# Patient Record
Sex: MALE | Race: WHITE | Employment: FULL TIME | URBAN - METROPOLITAN AREA
[De-identification: names, ages, dates, MRNs, and addresses within clinical notes are randomized per-mention and may not be internally consistent; named-entity substitution may affect disease eponyms.]

---

## 2017-06-27 ENCOUNTER — TELEPHONE (OUTPATIENT)
Dept: UROLOGY | Age: 47
End: 2017-06-27

## 2017-06-28 ENCOUNTER — TELEPHONE (OUTPATIENT)
Dept: UROLOGY | Age: 47
End: 2017-06-28

## 2017-06-28 ENCOUNTER — NURSE ONLY (OUTPATIENT)
Dept: UROLOGY | Age: 47
End: 2017-06-28

## 2017-06-28 DIAGNOSIS — R30.0 DYSURIA: ICD-10-CM

## 2017-06-28 DIAGNOSIS — R31.9 HEMATURIA: Primary | ICD-10-CM

## 2017-06-28 DIAGNOSIS — R30.0 DYSURIA: Primary | ICD-10-CM

## 2017-06-28 LAB
BILIRUBIN, POC: ABNORMAL
BLOOD URINE, POC: ABNORMAL
CLARITY, POC: CLEAR
COLOR, POC: YELLOW
GLUCOSE URINE, POC: 100
KETONES, POC: ABNORMAL
LEUKOCYTE EST, POC: ABNORMAL
NITRITE, POC: ABNORMAL
PH, POC: 5.5
PROTEIN, POC: ABNORMAL
SPECIFIC GRAVITY, POC: 1.01
UROBILINOGEN, POC: 0.2

## 2017-06-28 PROCEDURE — 81003 URINALYSIS AUTO W/O SCOPE: CPT | Performed by: UROLOGY

## 2017-06-30 LAB — URINE CULTURE, ROUTINE: NORMAL

## 2017-11-06 ENCOUNTER — OFFICE VISIT (OUTPATIENT)
Dept: UROLOGY | Age: 47
End: 2017-11-06

## 2017-11-06 VITALS
SYSTOLIC BLOOD PRESSURE: 116 MMHG | HEART RATE: 75 BPM | HEIGHT: 70 IN | BODY MASS INDEX: 30.64 KG/M2 | DIASTOLIC BLOOD PRESSURE: 62 MMHG | WEIGHT: 214 LBS

## 2017-11-06 DIAGNOSIS — R31.9 HEMATURIA, UNSPECIFIED TYPE: Primary | ICD-10-CM

## 2017-11-06 LAB
BILIRUBIN, POC: ABNORMAL
BLOOD URINE, POC: ABNORMAL
CLARITY, POC: CLEAR
COLOR, POC: YELLOW
GLUCOSE URINE, POC: ABNORMAL
KETONES, POC: ABNORMAL
LEUKOCYTE EST, POC: ABNORMAL
NITRITE, POC: ABNORMAL
PH, POC: 5.5
PROTEIN, POC: ABNORMAL
SPECIFIC GRAVITY, POC: <=1.005
UROBILINOGEN, POC: 0.2

## 2017-11-06 PROCEDURE — 99214 OFFICE O/P EST MOD 30 MIN: CPT | Performed by: UROLOGY

## 2017-11-06 PROCEDURE — 81003 URINALYSIS AUTO W/O SCOPE: CPT | Performed by: UROLOGY

## 2017-11-06 RX ORDER — CYCLOBENZAPRINE HCL 10 MG
10 TABLET ORAL 3 TIMES DAILY PRN
COMMUNITY

## 2017-11-06 RX ORDER — METHYLPREDNISOLONE 16 MG/1
TABLET ORAL
Qty: 2 TABLET | Refills: 0 | Status: SHIPPED | OUTPATIENT
Start: 2017-11-06 | End: 2018-03-05

## 2017-11-06 NOTE — PROGRESS NOTES
Chaperone for Intimate Exam    1. Was chaperone offered as part of the rooming process? offered, declined   2. If Chaperone is declined by patient, NA: chaperone was available and exam completed  3.  Chaperone is N/A
cyclobenzaprine (FLEXERIL) 10 MG tablet Take 10 mg by mouth 3 times daily as needed for Muscle spasms      methylPREDNISolone (MEDROL) 16 MG tablet Take 1 by mouth 12 hours and 2 hours before xray dye 2 tablet 0    escitalopram (LEXAPRO) 10 MG tablet TAKE 1 TABLET BY MOUTH EVERY DAY  1    atorvastatin (LIPITOR) 20 MG tablet TAKE 1 TABLET DAILY  0    VASCEPA 1 G CAPS capsule TAKE 2 CAPSULES TWICE DAILY  0    risperiDONE (RISPERDAL) 1 MG tablet TAKE 1 TABLET TWICE DAILY. 1    traZODone (DESYREL) 50 MG tablet        No current facility-administered medications for this visit. Contrast  [iodides]  All reviewed and verified by Dr Marx Found on today's visit    No results found for: PSA, PSADIA  Results for POC orders placed in visit on 11/06/17   POCT Urinalysis No Micro (Auto)   Result Value Ref Range    Color, UA yellow     Clarity, UA clear     Glucose, UA  mg/dL     Bilirubin, UA neg     Ketones, UA neg     Spec Grav, UA <=1.005     Blood, UA POC small     pH, UA 5.5     Protein, UA POC neg     Urobilinogen, UA 0.2     Leukocytes, UA neg     Nitrite, UA neg        Physical Exam  Vitals:    11/06/17 1019   BP: 116/62   Pulse: 75   Weight: 214 lb (97.1 kg)   Height: 5' 10\" (1.778 m)     Constitutional: patient is oriented to person, place, and time. patient appears well-developed. not in distress. Ears: Adequate hearing/no hearing loss  Head: Normocephalic. Atraumatic  Neck: Normal range of motion. Cardiovascular: Normal rate, BP reviewed. sinus rhythm  Pulmonary/Chest: Normal respiratory effort  no shortness of breath  Abdominal: Not distended. No hernias  Urologic Exam  Urinalysis shows hematuria. Prostate exam deferred. .  Musculoskeletal: Normal range of motion. Patient is ambulatory. Extremities: No edema   Neurological: Cranial nerves intact   Skin: Skin is warm and dry. No lesions. No rashes   Psychiatric:  Alert and oriented ×3.   Assessment  Microhematuria and a smoker  Plan  CT

## 2018-03-05 ENCOUNTER — PROCEDURE VISIT (OUTPATIENT)
Dept: UROLOGY | Age: 48
End: 2018-03-05
Payer: COMMERCIAL

## 2018-03-05 VITALS
WEIGHT: 215 LBS | SYSTOLIC BLOOD PRESSURE: 128 MMHG | BODY MASS INDEX: 30.78 KG/M2 | DIASTOLIC BLOOD PRESSURE: 76 MMHG | HEIGHT: 70 IN | HEART RATE: 82 BPM

## 2018-03-05 DIAGNOSIS — R31.9 HEMATURIA, UNSPECIFIED TYPE: Primary | ICD-10-CM

## 2018-03-05 PROCEDURE — 99999 PR OFFICE/OUTPT VISIT,PROCEDURE ONLY: CPT | Performed by: UROLOGY

## 2018-03-05 PROCEDURE — 52000 CYSTOURETHROSCOPY: CPT | Performed by: UROLOGY

## 2018-03-05 PROCEDURE — 81003 URINALYSIS AUTO W/O SCOPE: CPT | Performed by: UROLOGY

## 2018-03-05 RX ORDER — DOXYCYCLINE HYCLATE 100 MG
100 TABLET ORAL ONCE
Qty: 1 TABLET | Refills: 0 | COMMUNITY
Start: 2018-03-05 | End: 2018-03-05

## 2018-03-05 RX ORDER — OXCARBAZEPINE 150 MG/1
TABLET, FILM COATED ORAL
COMMUNITY
Start: 2018-02-12

## 2018-03-05 NOTE — PROGRESS NOTES
Time Out was called before Cystoscopy procedure. Patient is Latasha Varghese,  is 1970. Patient has the following allergies: Allergies   Allergen Reactions    Contrast  [Iodides] Hives     Rash, Hives, Mentation change   . Patient was given Doxycycline 100 mg  antibiotic before the procedure.

## 2018-03-05 NOTE — PROGRESS NOTES
Microhematuria and a smoker  Patient appeared to have cystoscopy to complete his hematuria workup  Patient was noted to have a less than 2 mm stone left distal ureter currently asymptomatic patient probably has passed a stone    EXAMINATION  CT ABDOMEN AND PELVIS.         REASON FOR EXAM  PAIN AND HEMATURIA.         TECHNIQUE  Axial CT sections of the kidneys, ureters and bladder were   performed.         FINDINGS     There is a 2 mm stone at the left ureterovesical junction   with mild left hydronephrosis and hydroureter.  Additional punctate   bilateral nephrolithiasis are demonstrated on these noncontrast images.    The remainder of the abdomen and pelvis shows no additional acute   pathology.  Lung bases are clear.  Visualized bones intact.       IMPRESSION        A 2-MM STONE AT LEFT URETEROVESICAL JUNCTION WITH MILD LEFT   HYDRONEPHROSIS AND HYDROURETER.          -  RADWHERE        Read By- Nathen Lopez   Released By- Natehn Lopez   Released Date Time- 08/08/13 3989    This document has been electronically signed.    ------------------------------------------------------------------------------     Cystoscopy report  Flexible cystoscope/local anesthetic  Normal penile urethra  Normal prostatic urethra  Normal bladder neck on retroflexion  Clear effluxing from both ureters  Normal bladder mucosa  No evidence of tumors  Negative hematuria workup  Antibiotic given  Final diagnosis  Idiopathic hematuria versus hematuria secondary to ureteral calculus  Patient will quit smoking  If he resumes smoking he will need another workup within the next 5 years patient  Patient and his wife instructed regarding further follow-up  Dr Alvaro Jhaveri

## 2023-04-03 LAB
ALANINE AMINOTRANSFERASE (SGPT) (U/L) IN SER/PLAS: 31 U/L (ref 10–52)
ALBUMIN (G/DL) IN SER/PLAS: 4.7 G/DL (ref 3.4–5)
ALKALINE PHOSPHATASE (U/L) IN SER/PLAS: 44 U/L (ref 33–120)
ANION GAP IN SER/PLAS: 9 MMOL/L (ref 10–20)
ASPARTATE AMINOTRANSFERASE (SGOT) (U/L) IN SER/PLAS: 23 U/L (ref 9–39)
BILIRUBIN TOTAL (MG/DL) IN SER/PLAS: 0.6 MG/DL (ref 0–1.2)
CALCIDIOL (25 OH VITAMIN D3) (NG/ML) IN SER/PLAS: 36 NG/ML
CALCIUM (MG/DL) IN SER/PLAS: 9.4 MG/DL (ref 8.6–10.3)
CARBON DIOXIDE, TOTAL (MMOL/L) IN SER/PLAS: 28 MMOL/L (ref 21–32)
CHLORIDE (MMOL/L) IN SER/PLAS: 106 MMOL/L (ref 98–107)
CHOLESTEROL (MG/DL) IN SER/PLAS: 128 MG/DL (ref 0–199)
CHOLESTEROL IN HDL (MG/DL) IN SER/PLAS: 27.9 MG/DL
CHOLESTEROL/HDL RATIO: 4.6
CREATININE (MG/DL) IN SER/PLAS: 0.96 MG/DL (ref 0.5–1.3)
ESTIMATED AVERAGE GLUCOSE FOR HBA1C: 108 MG/DL
GFR MALE: >90 ML/MIN/1.73M2
GLUCOSE (MG/DL) IN SER/PLAS: 105 MG/DL (ref 74–99)
HEMOGLOBIN A1C/HEMOGLOBIN TOTAL IN BLOOD: 5.4 %
LDL: 59 MG/DL (ref 0–99)
NON HDL CHOLESTEROL: 100 MG/DL
POTASSIUM (MMOL/L) IN SER/PLAS: 4.3 MMOL/L (ref 3.5–5.3)
PROTEIN TOTAL: 7.2 G/DL (ref 6.4–8.2)
SODIUM (MMOL/L) IN SER/PLAS: 139 MMOL/L (ref 136–145)
TRIGLYCERIDE (MG/DL) IN SER/PLAS: 205 MG/DL (ref 0–149)
UREA NITROGEN (MG/DL) IN SER/PLAS: 10 MG/DL (ref 6–23)
VLDL: 41 MG/DL (ref 0–40)

## 2023-04-13 PROBLEM — I10 BENIGN ESSENTIAL HYPERTENSION: Status: ACTIVE | Noted: 2023-04-13

## 2023-04-13 PROBLEM — F98.8 ADD (ATTENTION DEFICIT DISORDER): Status: ACTIVE | Noted: 2023-04-13

## 2023-04-13 PROBLEM — I73.9 CLAUDICATION (CMS-HCC): Status: ACTIVE | Noted: 2023-04-13

## 2023-04-13 PROBLEM — E78.5 HYPERLIPIDEMIA: Status: ACTIVE | Noted: 2023-04-13

## 2023-04-13 PROBLEM — L05.91 PILONIDAL CYST: Status: ACTIVE | Noted: 2023-04-13

## 2023-04-13 PROBLEM — L91.8 SKIN TAG: Status: ACTIVE | Noted: 2023-04-13

## 2023-04-13 PROBLEM — E87.1 HYPONATREMIA: Status: ACTIVE | Noted: 2023-04-13

## 2023-04-13 PROBLEM — F33.9 RECURRENT MAJOR DEPRESSIVE DISORDER (CMS-HCC): Status: ACTIVE | Noted: 2023-04-13

## 2023-04-13 PROBLEM — M25.579 ANKLE PAIN: Status: ACTIVE | Noted: 2023-04-13

## 2023-04-13 PROBLEM — G47.33 OSA (OBSTRUCTIVE SLEEP APNEA): Status: ACTIVE | Noted: 2023-04-13

## 2023-04-13 PROBLEM — F41.9 ANXIETY: Status: ACTIVE | Noted: 2023-04-13

## 2023-04-13 PROBLEM — M25.512 BILATERAL SHOULDER PAIN: Status: ACTIVE | Noted: 2023-04-13

## 2023-04-13 PROBLEM — R06.2 WHEEZING: Status: ACTIVE | Noted: 2023-04-13

## 2023-04-13 PROBLEM — L03.032 CELLULITIS OF GREAT TOE OF LEFT FOOT: Status: ACTIVE | Noted: 2023-04-13

## 2023-04-13 PROBLEM — R31.29 OTHER MICROSCOPIC HEMATURIA: Status: ACTIVE | Noted: 2023-04-13

## 2023-04-13 PROBLEM — R82.90 ABNORMAL URINE ODOR: Status: ACTIVE | Noted: 2023-04-13

## 2023-04-13 PROBLEM — M25.511 BILATERAL SHOULDER PAIN: Status: ACTIVE | Noted: 2023-04-13

## 2023-04-13 PROBLEM — K76.0 FATTY LIVER: Status: ACTIVE | Noted: 2023-04-13

## 2023-04-13 PROBLEM — R79.89 ABNORMAL THYROID BLOOD TEST: Status: ACTIVE | Noted: 2023-04-13

## 2023-04-13 PROBLEM — R31.21 ASYMPTOMATIC MICROSCOPIC HEMATURIA: Status: ACTIVE | Noted: 2023-04-13

## 2023-04-13 PROBLEM — E53.8 DIETARY B12 DEFICIENCY: Status: ACTIVE | Noted: 2023-04-13

## 2023-04-13 PROBLEM — E11.9 DIABETES MELLITUS (MULTI): Status: ACTIVE | Noted: 2023-04-13

## 2023-04-13 PROBLEM — M25.562 LEFT KNEE PAIN: Status: ACTIVE | Noted: 2023-04-13

## 2023-04-13 PROBLEM — E55.9 VITAMIN D DEFICIENCY: Status: ACTIVE | Noted: 2023-04-13

## 2023-04-13 RX ORDER — FLUTICASONE PROPIONATE 50 MCG
2 SPRAY, SUSPENSION (ML) NASAL DAILY
COMMUNITY
Start: 2021-06-19

## 2023-04-13 RX ORDER — DULAGLUTIDE 1.5 MG/.5ML
1.5 INJECTION, SOLUTION SUBCUTANEOUS
COMMUNITY
Start: 2022-08-11 | End: 2023-04-15 | Stop reason: SDUPTHER

## 2023-04-13 RX ORDER — FLUOXETINE 20 MG/1
1 TABLET ORAL DAILY
COMMUNITY

## 2023-04-13 RX ORDER — BEMPEDOIC ACID AND EZETIMIBE 180; 10 MG/1; MG/1
1 TABLET, FILM COATED ORAL DAILY
COMMUNITY
Start: 2022-08-09

## 2023-04-13 RX ORDER — ACETAMINOPHEN 500 MG
50 TABLET ORAL DAILY
COMMUNITY
End: 2024-01-24 | Stop reason: DRUGHIGH

## 2023-04-13 RX ORDER — FLUOXETINE HYDROCHLORIDE 20 MG/1
1 CAPSULE ORAL DAILY
COMMUNITY
Start: 2019-01-19 | End: 2023-04-15 | Stop reason: WASHOUT

## 2023-04-13 RX ORDER — BLOOD SUGAR DIAGNOSTIC
STRIP MISCELLANEOUS 2 TIMES DAILY
COMMUNITY
Start: 2017-04-04 | End: 2024-01-24

## 2023-04-13 RX ORDER — SODIUM PICOSULFATE, MAGNESIUM OXIDE, AND ANHYDROUS CITRIC ACID 10; 3.5; 12 MG/160ML; G/160ML; G/160ML
LIQUID ORAL
COMMUNITY
Start: 2022-08-08 | End: 2024-02-05 | Stop reason: WASHOUT

## 2023-04-13 RX ORDER — MULTIVITAMIN
1 TABLET ORAL DAILY
COMMUNITY
End: 2024-03-13 | Stop reason: WASHOUT

## 2023-04-13 RX ORDER — AMLODIPINE BESYLATE 5 MG/1
1 TABLET ORAL DAILY
COMMUNITY
Start: 2022-04-05 | End: 2024-02-05 | Stop reason: SDUPTHER

## 2023-04-13 RX ORDER — LAMOTRIGINE 25 MG/1
2 TABLET ORAL DAILY
COMMUNITY
Start: 2022-05-09

## 2023-04-13 RX ORDER — ASPIRIN 81 MG/1
81 TABLET ORAL DAILY
COMMUNITY

## 2023-04-15 ENCOUNTER — OFFICE VISIT (OUTPATIENT)
Dept: PRIMARY CARE | Facility: CLINIC | Age: 53
End: 2023-04-15
Payer: COMMERCIAL

## 2023-04-15 VITALS
RESPIRATION RATE: 16 BRPM | BODY MASS INDEX: 28.77 KG/M2 | OXYGEN SATURATION: 95 % | TEMPERATURE: 98 F | HEIGHT: 70 IN | WEIGHT: 201 LBS | SYSTOLIC BLOOD PRESSURE: 110 MMHG | HEART RATE: 86 BPM | DIASTOLIC BLOOD PRESSURE: 64 MMHG

## 2023-04-15 DIAGNOSIS — E55.9 VITAMIN D DEFICIENCY: ICD-10-CM

## 2023-04-15 DIAGNOSIS — Z23 NEED FOR VACCINATION: ICD-10-CM

## 2023-04-15 DIAGNOSIS — E78.2 MIXED HYPERLIPIDEMIA: Primary | ICD-10-CM

## 2023-04-15 DIAGNOSIS — F33.42 RECURRENT MAJOR DEPRESSIVE DISORDER, IN FULL REMISSION (CMS-HCC): ICD-10-CM

## 2023-04-15 DIAGNOSIS — Z12.5 PROSTATE CANCER SCREENING: ICD-10-CM

## 2023-04-15 DIAGNOSIS — E11.9 TYPE 2 DIABETES MELLITUS WITHOUT COMPLICATION, WITHOUT LONG-TERM CURRENT USE OF INSULIN (MULTI): ICD-10-CM

## 2023-04-15 PROBLEM — E87.1 HYPONATREMIA: Status: RESOLVED | Noted: 2023-04-13 | Resolved: 2023-04-15

## 2023-04-15 PROBLEM — L03.032 CELLULITIS OF GREAT TOE OF LEFT FOOT: Status: RESOLVED | Noted: 2023-04-13 | Resolved: 2023-04-15

## 2023-04-15 PROBLEM — I73.9 CLAUDICATION (CMS-HCC): Status: RESOLVED | Noted: 2023-04-13 | Resolved: 2023-04-15

## 2023-04-15 PROBLEM — M25.562 LEFT KNEE PAIN: Status: RESOLVED | Noted: 2023-04-13 | Resolved: 2023-04-15

## 2023-04-15 PROBLEM — M25.579 ANKLE PAIN: Status: RESOLVED | Noted: 2023-04-13 | Resolved: 2023-04-15

## 2023-04-15 PROCEDURE — 3044F HG A1C LEVEL LT 7.0%: CPT | Performed by: FAMILY MEDICINE

## 2023-04-15 PROCEDURE — 90750 HZV VACC RECOMBINANT IM: CPT | Performed by: FAMILY MEDICINE

## 2023-04-15 PROCEDURE — 3074F SYST BP LT 130 MM HG: CPT | Performed by: FAMILY MEDICINE

## 2023-04-15 PROCEDURE — 90471 IMMUNIZATION ADMIN: CPT | Performed by: FAMILY MEDICINE

## 2023-04-15 PROCEDURE — 3078F DIAST BP <80 MM HG: CPT | Performed by: FAMILY MEDICINE

## 2023-04-15 PROCEDURE — 99214 OFFICE O/P EST MOD 30 MIN: CPT | Performed by: FAMILY MEDICINE

## 2023-04-15 RX ORDER — RISPERIDONE 1 MG/1
1 TABLET ORAL 2 TIMES DAILY
COMMUNITY

## 2023-04-15 RX ORDER — METFORMIN HYDROCHLORIDE 500 MG/1
500 TABLET, EXTENDED RELEASE ORAL
Qty: 90 TABLET | Refills: 0 | Status: SHIPPED | OUTPATIENT
Start: 2023-04-15 | End: 2023-07-15 | Stop reason: SDUPTHER

## 2023-04-15 RX ORDER — CYCLOBENZAPRINE HCL 10 MG
10 TABLET ORAL 3 TIMES DAILY PRN
COMMUNITY
Start: 2014-10-14

## 2023-04-15 RX ORDER — METFORMIN HYDROCHLORIDE 500 MG/1
500 TABLET, EXTENDED RELEASE ORAL
COMMUNITY
Start: 2023-03-31 | End: 2023-04-15 | Stop reason: SDUPTHER

## 2023-04-15 RX ORDER — DULAGLUTIDE 1.5 MG/.5ML
1.5 INJECTION, SOLUTION SUBCUTANEOUS
Qty: 12 EACH | Refills: 0 | Status: SHIPPED | OUTPATIENT
Start: 2023-04-15 | End: 2023-07-15 | Stop reason: SDUPTHER

## 2023-04-15 RX ORDER — OXCARBAZEPINE 150 MG/1
150 TABLET, FILM COATED ORAL 2 TIMES DAILY
COMMUNITY
Start: 2018-02-12 | End: 2023-04-15 | Stop reason: WASHOUT

## 2023-04-15 RX ORDER — ICOSAPENT ETHYL 1000 MG/1
2 CAPSULE ORAL 2 TIMES DAILY
COMMUNITY
End: 2023-04-15 | Stop reason: SDUPTHER

## 2023-04-15 RX ORDER — ICOSAPENT ETHYL 1000 MG/1
2 CAPSULE ORAL 2 TIMES DAILY
Qty: 360 CAPSULE | Refills: 0 | Status: SHIPPED | OUTPATIENT
Start: 2023-04-15 | End: 2023-07-15 | Stop reason: SDUPTHER

## 2023-04-15 NOTE — PATIENT INSTRUCTIONS
Follow up in 3 months with labs to be done PRIOR.    It was a pleasure to see you today. Thank you for choosing us for your health care needs.    If you have lab or other testing completed and have not been informed of results within one week, please call the office for your results.    If you haven't done so, consider signing up for [email or web address], the [facility] personal health record. Ask the staff how you can get started.

## 2023-04-15 NOTE — PROGRESS NOTES
Subjective   Patient ID: Phi Viera is a 53 y.o. male who presents for a 3-month follow-up monitoring and management of his DM Type 2, hyperlipidemia, obstructive sleep apnea, vitamin D deficiency, and anxiety/depression.      HPI     Patient has HTN.  He does not monitor his BP at home.   He reports that cardiology has him on amlodipine for elevated BP.  Denies chest pain, dizziness, LE swelling.     He has type 2 diabetes.  He does monitor his sugars at home.  Pt denies any polyuria, polydipsia, polyphagia.    He has hyperlipidemia.  Patient tries to limit the amount of fatty foods and high cholesterol foods that are consumed.  He has been compliant with his Vascepa and denies any noted side effects.  HE HAS BEEN INTOLERANT OF MULTIPLE STATINS DUE TO MYALGIA.  Pt has been tolerating the Nexlizet well.    He has known vitamin D deficiency.  Pt has been compliant with dosing of his vitamin D supplementation.    He has depression.  He is treated with Fluoxetine.   He continues to follow with his psychiatrist on a regular basis.    Patient has had persistent microscopic hematuria.  Pt was last seen by urology (Dr. Barger) for blood in his urine.   He had two cystoscopies done in the past without any abnormal findings. Pt states no further cystoscopy needed.    Patient previously had hyponatremia believed to be a result of his oxcarbazepine. Psychiatry changed medications and sodium level returned to normal on follow up labs.       He is still smoking.  Pt has a planned quit date of tomorrow 4/16/2023      Review of Systems    Constitutional: Patient denies any fever, chills, loss of appetite, or unexplained weight loss.  Cardiovascular: Patient denies any chest pain, shortness of breath with exertion, tachycardia, palpitations, orthopnea, or paroxysmal nocturnal dyspnea.  Respiratory: Patient denies any cough, shortness breath, or wheezing.  Gastrointestinal: Patient denies any nausea, vomiting, diarrhea,  "constipation, melena, hematochezia, or reflux symptoms.  Skin: Denies any rashes or skin lesions.   Neurology: Patient denies any new motor or sensory losses.  Denies any numbness, tingling, weakness, and incoordination of the extremities.  Patient also denies any tremor, seizures, or gait instability.  Endocrinology: Denies any polyuria, polydipsia, polyphagia, or heat/cold intolerance.        Objective   /64   Pulse 86   Temp 36.7 °C (98 °F)   Resp 16   Ht 1.778 m (5' 10\")   Wt 91.2 kg (201 lb)   SpO2 95%   BMI 28.84 kg/m²     Physical Exam  General Appearance: Alert and cooperative, in no acute distress, well-developed/well-nourished.  Neck: Supple and without adenopathy or rigidity.  There is no JVD at 90° and no carotid bruits are noted.  There is no thyromegaly, thyroid tenderness, or palpable thyroid nodules.  Heart: Regular rate and rhythm without murmur or ectopy.  Respiratory: Lungs are clear to auscultation bilaterally with good air exchange.  Good respiratory effort and no accessory muscle use.  Skin: Good turgor, moist, warm and without rashes or lesions.  Neurological exam: Alert and oriented ×3, no tremor, normal gait.  Extremities: No clubbing, cyanosis, or edema            Assessment/Plan          SHINGRIX DOSE #1 GIVEN TODAY      Hyperlipidemia: Stable based on his last labs.  Triglycerides were elevated (351), otherwise LDL is well controlled (34).   Dietary changes, exercise, and maintenance of healthy weight were discussed at length.  HE HAS BEEN INTOLERANT OF MULTIPLE STATINS IN THE PAST BUT IS TOLERATING THE NEXLIZET WELL.    DM Type 2:   His most recent labs reveal an A1c of 5.9%.  Dietary changes, exercise, and maintenance of a healthy weight were discussed at length.    Vit D deficiency: We will continue to monitor.   He will continue the vitamin D at a dose of 5000 units 5 days per week.    Tobacco Abuse: He has unfortunately continued to smoke.   SMOKING CESSATION ONCE AGAIN " ENCOURAGED.  Patient understands that continued smoking will increase the risks of lung disease, vascular disease, and multiple malignancies.  HE HAS A QUIT DATE OF 4/16/2023 PLANNED    Depression: Stable based on symptoms.   Pt will continue to follow up with his psychiatrist on a regular basis.  Continue current medications.        Colonoscopy completed in October 2022. Due for repeat in 2 years.

## 2023-07-15 ENCOUNTER — OFFICE VISIT (OUTPATIENT)
Dept: PRIMARY CARE | Facility: CLINIC | Age: 53
End: 2023-07-15
Payer: COMMERCIAL

## 2023-07-15 VITALS
TEMPERATURE: 98 F | RESPIRATION RATE: 16 BRPM | DIASTOLIC BLOOD PRESSURE: 68 MMHG | BODY MASS INDEX: 28.12 KG/M2 | OXYGEN SATURATION: 96 % | HEART RATE: 85 BPM | WEIGHT: 196 LBS | SYSTOLIC BLOOD PRESSURE: 118 MMHG

## 2023-07-15 DIAGNOSIS — E11.9 TYPE 2 DIABETES MELLITUS WITHOUT COMPLICATION, WITHOUT LONG-TERM CURRENT USE OF INSULIN (MULTI): ICD-10-CM

## 2023-07-15 DIAGNOSIS — F33.42 RECURRENT MAJOR DEPRESSIVE DISORDER, IN FULL REMISSION (CMS-HCC): ICD-10-CM

## 2023-07-15 DIAGNOSIS — I10 BENIGN ESSENTIAL HYPERTENSION: Primary | ICD-10-CM

## 2023-07-15 DIAGNOSIS — E55.9 VITAMIN D DEFICIENCY: ICD-10-CM

## 2023-07-15 DIAGNOSIS — E78.2 MIXED HYPERLIPIDEMIA: ICD-10-CM

## 2023-07-15 PROCEDURE — 3078F DIAST BP <80 MM HG: CPT | Performed by: FAMILY MEDICINE

## 2023-07-15 PROCEDURE — 3074F SYST BP LT 130 MM HG: CPT | Performed by: FAMILY MEDICINE

## 2023-07-15 PROCEDURE — 99214 OFFICE O/P EST MOD 30 MIN: CPT | Performed by: FAMILY MEDICINE

## 2023-07-15 PROCEDURE — 3044F HG A1C LEVEL LT 7.0%: CPT | Performed by: FAMILY MEDICINE

## 2023-07-15 RX ORDER — DULAGLUTIDE 1.5 MG/.5ML
1.5 INJECTION, SOLUTION SUBCUTANEOUS
Qty: 12 EACH | Refills: 0 | Status: SHIPPED | OUTPATIENT
Start: 2023-07-15 | End: 2024-01-24 | Stop reason: SDUPTHER

## 2023-07-15 RX ORDER — METFORMIN HYDROCHLORIDE 500 MG/1
1000 TABLET, EXTENDED RELEASE ORAL
Qty: 180 TABLET | Refills: 0 | Status: SHIPPED | OUTPATIENT
Start: 2023-07-15 | End: 2024-05-14 | Stop reason: WASHOUT

## 2023-07-15 RX ORDER — ICOSAPENT ETHYL 1000 MG/1
2 CAPSULE ORAL 2 TIMES DAILY
Qty: 360 CAPSULE | Refills: 0 | Status: SHIPPED | OUTPATIENT
Start: 2023-07-15 | End: 2023-10-13

## 2023-07-15 NOTE — PATIENT INSTRUCTIONS
Follow up in 3 months with labs to be done PRIOR.    It was a pleasure to see you today. Thank you for choosing us for your health care needs.    If you have lab or other testing completed and have not been informed of results within one week, please call the office for your results.    If you haven't done so, consider signing up for [facility], the [facility] personal health record. Ask the staff how you can get started.

## 2023-07-15 NOTE — PROGRESS NOTES
Subjective   Patient ID: Phi Viera is a 53 y.o. male who presents for Follow-up (3 month ).    HPI         LABS ORDERED FOR HIS 7/15/23 APPT WERE NOT COMPLETED.  LAST LABS DONE 4/3/2023          Patient has HTN.  He does not monitor his BP at home.   His cardiologist prescribed his Amlodipine.  Denies chest pain, dizziness, LE swelling.      He has type 2 diabetes.  He does monitor his sugars at home.  Pt denies any polyuria, polydipsia, polyphagia.     He has hyperlipidemia.  Pt tries to limit the amount of fatty & high cholesterol foods that are consumed.  He has been compliant with his Vascepa and denies any noted side effects.  HE HAS BEEN INTOLERANT OF MULTIPLE STATINS DUE TO MYALGIA.  Pt has been tolerating the Nexlizet well.     Pt has known vitamin D deficiency.  He has been compliant with dosing of his vitamin D supplementation.     Pt has depression.  He is treated with Fluoxetine.   He continues to follow with his psychiatrist on a regular basis.              7/15/23: He is still smoking.       Review of Systems  Constitutional: Patient denies any fever, chills, loss of appetite, or unexplained weight loss.  Cardiovascular: Patient denies any chest pain, shortness of breath with exertion, tachycardia, palpitations, orthopnea, or paroxysmal nocturnal dyspnea.  Respiratory: Patient denies any cough, shortness breath, or wheezing.  Gastrointestinal: Patient denies any nausea, vomiting, diarrhea, constipation, melena, hematochezia, or reflux symptoms.  Skin: Denies any rashes or skin lesions.   Neurology: Patient denies any new motor or sensory losses.  Denies any numbness, tingling, weakness, and incoordination of the extremities.  Patient also denies any tremor, seizures, or gait instability.  Endocrinology: Denies any polyuria, polydipsia, polyphagia, or heat/cold intolerance.      Objective   /68   Pulse 85   Temp 36.7 °C (98 °F)   Resp 16   Wt 88.9 kg (196 lb)   SpO2 96%   BMI 28.12 kg/m²      Physical Exam  General Appearance: Alert and cooperative, in no acute distress, well-developed/well-nourished male.  Neck: Supple and without adenopathy or rigidity.  There is no JVD at 90° and no carotid bruits are noted.  There is no thyromegaly, thyroid tenderness, or palpable thyroid nodules.  Heart: Regular rate and rhythm without murmur or ectopy.  Respiratory: Lungs are clear to auscultation bilaterally with good air exchange.  Good respiratory effort and no accessory muscle use.  Skin: Good turgor, moist, warm and without rashes or lesions.  Neurological exam: Alert and oriented ×3, no tremor, normal gait.  Extremities: No clubbing, cyanosis, or edema      Assessment/Plan     HTN:  Blood pressure appears adequately controlled and we will continue with the current antihypertensive therapy.    Hyperlipidemia: Stable based on his last labs.  Triglycerides were elevated (351), otherwise LDL is well controlled.   Dietary changes, exercise, and maintenance of healthy weight were discussed at length.  Continues to tolerate the Nexlizet well.     DM Type 2:   His most recent labs reveal an A1c of 5.9%.  Dietary changes, exercise, and maintenance of a healthy weight were discussed at length.     Vit D deficiency: We will continue to monitor.   He will continue the vitamin D at a dose of 5000 units 5 days per week.     Tobacco Abuse: He has unfortunately continued to smoke.   SMOKING CESSATION ONCE AGAIN ENCOURAGED.  Patient understands that continued smoking will increase the risks of lung disease, vascular disease, and multiple malignancies.     Depression: Stable based on symptoms.   Pt will continue to follow up with his psychiatrist on a regular basis.  Continue current medications.           Colonoscopy completed in October 2022. Due for repeat in 2 years.        Orders Placed This Encounter   Procedures    Albumin , Urine Random    Comprehensive Metabolic Panel    Hemoglobin A1C    Lipid Panel     Requested  Prescriptions     Signed Prescriptions Disp Refills    metFORMIN XR (Glucophage-XR) 500 mg 24 hr tablet 180 tablet 0     Sig: Take 2 tablets (1,000 mg) by mouth once daily in the evening. Take with meals.    Vascepa 1 gram capsule 360 capsule 0     Sig: Take 2 capsules (2 g) by mouth 2 times a day.    dulaglutide (Trulicity) 1.5 mg/0.5 mL pen injector injection 12 each 0     Sig: Inject 1.5 mg under the skin 1 (one) time per week.

## 2023-09-11 DIAGNOSIS — E11.9 TYPE 2 DIABETES MELLITUS WITHOUT COMPLICATION, WITHOUT LONG-TERM CURRENT USE OF INSULIN (MULTI): ICD-10-CM

## 2023-09-11 RX ORDER — METFORMIN HYDROCHLORIDE 500 MG/1
500 TABLET, EXTENDED RELEASE ORAL
Qty: 90 TABLET | OUTPATIENT
Start: 2023-09-11

## 2023-09-13 ENCOUNTER — TELEPHONE (OUTPATIENT)
Dept: PRIMARY CARE | Facility: CLINIC | Age: 53
End: 2023-09-13

## 2023-10-16 ENCOUNTER — LAB (OUTPATIENT)
Dept: LAB | Facility: LAB | Age: 53
End: 2023-10-16
Payer: COMMERCIAL

## 2023-10-16 DIAGNOSIS — Z12.5 PROSTATE CANCER SCREENING: ICD-10-CM

## 2023-10-16 DIAGNOSIS — E78.2 MIXED HYPERLIPIDEMIA: ICD-10-CM

## 2023-10-16 DIAGNOSIS — E11.9 TYPE 2 DIABETES MELLITUS WITHOUT COMPLICATION, WITHOUT LONG-TERM CURRENT USE OF INSULIN (MULTI): ICD-10-CM

## 2023-10-16 LAB
ALBUMIN SERPL BCP-MCNC: 4.4 G/DL (ref 3.4–5)
ALP SERPL-CCNC: 56 U/L (ref 33–120)
ALT SERPL W P-5'-P-CCNC: 15 U/L (ref 10–52)
ANION GAP SERPL CALC-SCNC: 10 MMOL/L (ref 10–20)
AST SERPL W P-5'-P-CCNC: 17 U/L (ref 9–39)
BILIRUB SERPL-MCNC: 0.3 MG/DL (ref 0–1.2)
BUN SERPL-MCNC: 15 MG/DL (ref 6–23)
CALCIUM SERPL-MCNC: 9.5 MG/DL (ref 8.6–10.3)
CHLORIDE SERPL-SCNC: 105 MMOL/L (ref 98–107)
CHOLEST SERPL-MCNC: 120 MG/DL (ref 0–199)
CHOLESTEROL/HDL RATIO: 3.7
CO2 SERPL-SCNC: 28 MMOL/L (ref 21–32)
CREAT SERPL-MCNC: 1.22 MG/DL (ref 0.5–1.3)
CREAT UR-MCNC: 101.6 MG/DL (ref 20–370)
EST. AVERAGE GLUCOSE BLD GHB EST-MCNC: 126 MG/DL
GFR SERPL CREATININE-BSD FRML MDRD: 72 ML/MIN/1.73M*2
GLUCOSE SERPL-MCNC: 97 MG/DL (ref 74–99)
HBA1C MFR BLD: 6 %
HDLC SERPL-MCNC: 32.1 MG/DL
LDLC SERPL CALC-MCNC: 41 MG/DL
MICROALBUMIN UR-MCNC: <7 MG/L
MICROALBUMIN/CREAT UR: NORMAL MG/G{CREAT}
NON HDL CHOLESTEROL: 88 MG/DL (ref 0–149)
POTASSIUM SERPL-SCNC: 4.2 MMOL/L (ref 3.5–5.3)
PROT SERPL-MCNC: 7 G/DL (ref 6.4–8.2)
PSA SERPL-MCNC: 0.07 NG/ML
SODIUM SERPL-SCNC: 139 MMOL/L (ref 136–145)
TRIGL SERPL-MCNC: 234 MG/DL (ref 0–149)
VLDL: 47 MG/DL (ref 0–40)

## 2023-10-16 PROCEDURE — 80053 COMPREHEN METABOLIC PANEL: CPT

## 2023-10-16 PROCEDURE — 82043 UR ALBUMIN QUANTITATIVE: CPT

## 2023-10-16 PROCEDURE — 80061 LIPID PANEL: CPT

## 2023-10-16 PROCEDURE — 36415 COLL VENOUS BLD VENIPUNCTURE: CPT

## 2023-10-16 PROCEDURE — 83036 HEMOGLOBIN GLYCOSYLATED A1C: CPT

## 2023-10-16 PROCEDURE — 82570 ASSAY OF URINE CREATININE: CPT

## 2023-10-16 PROCEDURE — 84153 ASSAY OF PSA TOTAL: CPT

## 2023-10-23 ENCOUNTER — OFFICE VISIT (OUTPATIENT)
Dept: PRIMARY CARE | Facility: CLINIC | Age: 53
End: 2023-10-23
Payer: COMMERCIAL

## 2023-10-23 VITALS
OXYGEN SATURATION: 96 % | DIASTOLIC BLOOD PRESSURE: 72 MMHG | TEMPERATURE: 98.4 F | SYSTOLIC BLOOD PRESSURE: 111 MMHG | HEART RATE: 78 BPM | HEIGHT: 70 IN | BODY MASS INDEX: 27.77 KG/M2 | WEIGHT: 194 LBS

## 2023-10-23 DIAGNOSIS — I10 BENIGN ESSENTIAL HYPERTENSION: Primary | ICD-10-CM

## 2023-10-23 DIAGNOSIS — E55.9 VITAMIN D DEFICIENCY: ICD-10-CM

## 2023-10-23 DIAGNOSIS — F33.42 RECURRENT MAJOR DEPRESSIVE DISORDER, IN FULL REMISSION (CMS-HCC): ICD-10-CM

## 2023-10-23 DIAGNOSIS — E11.9 TYPE 2 DIABETES MELLITUS WITHOUT COMPLICATION, WITHOUT LONG-TERM CURRENT USE OF INSULIN (MULTI): ICD-10-CM

## 2023-10-23 DIAGNOSIS — E78.2 MIXED HYPERLIPIDEMIA: ICD-10-CM

## 2023-10-23 DIAGNOSIS — F17.210 CIGARETTE SMOKER: ICD-10-CM

## 2023-10-23 PROBLEM — M25.562 LEFT KNEE PAIN: Status: ACTIVE | Noted: 2023-10-23

## 2023-10-23 PROBLEM — Z79.85 LONG-TERM (CURRENT) USE OF INJECTABLE NON-INSULIN ANTIDIABETIC DRUGS: Status: ACTIVE | Noted: 2022-10-21

## 2023-10-23 PROBLEM — F17.200 NICOTINE DEPENDENCE: Status: ACTIVE | Noted: 2022-10-21

## 2023-10-23 PROBLEM — Z72.0 TOBACCO USE: Status: ACTIVE | Noted: 2023-06-06

## 2023-10-23 PROBLEM — Z79.82 LONG TERM (CURRENT) USE OF ASPIRIN: Status: ACTIVE | Noted: 2022-10-21

## 2023-10-23 PROBLEM — R07.9 CHEST PAIN: Status: ACTIVE | Noted: 2023-10-23

## 2023-10-23 PROBLEM — E66.3 OVERWEIGHT: Status: ACTIVE | Noted: 2023-10-23

## 2023-10-23 PROBLEM — E66.9 OBESITY: Status: ACTIVE | Noted: 2023-10-23

## 2023-10-23 PROBLEM — M25.519 SHOULDER PAIN: Status: ACTIVE | Noted: 2023-04-13

## 2023-10-23 PROBLEM — Z86.59 HISTORY OF MENTAL DISORDER: Status: ACTIVE | Noted: 2023-10-23

## 2023-10-23 PROBLEM — K57.30 DIVERTICULOSIS OF LARGE INTESTINE WITHOUT PERFORATION OR ABSCESS WITHOUT BLEEDING: Status: ACTIVE | Noted: 2022-10-21

## 2023-10-23 PROBLEM — Z79.84 LONG TERM (CURRENT) USE OF ORAL HYPOGLYCEMIC DRUGS: Status: ACTIVE | Noted: 2022-10-21

## 2023-10-23 PROBLEM — F32.A DEPRESSIVE DISORDER: Status: ACTIVE | Noted: 2023-04-13

## 2023-10-23 PROCEDURE — 3062F POS MACROALBUMINURIA REV: CPT | Performed by: FAMILY MEDICINE

## 2023-10-23 PROCEDURE — 3078F DIAST BP <80 MM HG: CPT | Performed by: FAMILY MEDICINE

## 2023-10-23 PROCEDURE — 3074F SYST BP LT 130 MM HG: CPT | Performed by: FAMILY MEDICINE

## 2023-10-23 PROCEDURE — 3048F LDL-C <100 MG/DL: CPT | Performed by: FAMILY MEDICINE

## 2023-10-23 PROCEDURE — 3044F HG A1C LEVEL LT 7.0%: CPT | Performed by: FAMILY MEDICINE

## 2023-10-23 PROCEDURE — 99214 OFFICE O/P EST MOD 30 MIN: CPT | Performed by: FAMILY MEDICINE

## 2023-10-23 RX ORDER — CLOTRIMAZOLE 10 MG/1
10 LOZENGE ORAL; TOPICAL
COMMUNITY
Start: 2023-10-17 | End: 2024-01-24

## 2023-10-23 ASSESSMENT — PATIENT HEALTH QUESTIONNAIRE - PHQ9
1. LITTLE INTEREST OR PLEASURE IN DOING THINGS: NOT AT ALL
SUM OF ALL RESPONSES TO PHQ9 QUESTIONS 1 AND 2: 0
2. FEELING DOWN, DEPRESSED OR HOPELESS: NOT AT ALL

## 2023-10-23 NOTE — PATIENT INSTRUCTIONS
Follow up in 3 months with labs to be done PRIOR.    It was a pleasure to see you today. Thank you for choosing us for your health care needs.    If you have lab or other testing completed and have not been informed of results within one week, please call the office for your results.    If you haven't done so, consider signing up for [facility], the [facility] personal health record. Ask the staff how you can get started.

## 2023-10-23 NOTE — PROGRESS NOTES
Subjective   Patient ID: Phi Viera is a 53 y.o. male who presents for Follow-up.    HPI     Has had some dental problems so he has really limited his diet to soft foods.  Was getting low glucose readings as a result so he cut out his diabetes medications.  Just restarted on 1 metformin per day and the Vascepa.    Remains off the Trulicity.        NO NEW CONCERNS   Labs: 10/16/2023  Colonoscopy: 2022     Patient has HTN.  He does not monitor his BP at home.   His cardiologist prescribed his Amlodipine.  Denies chest pain, dizziness, LE swelling.      He has type 2 diabetes.  He does monitor his sugars at home.  Pt denies any polyuria, polydipsia, polyphagia.      He has hyperlipidemia.  Pt tries to limit the amount of fatty & high cholesterol foods that are consumed.  He has been compliant with his Vascepa and denies any noted side effects.  Intolerant of multiple statins in the past.  Pt has been tolerating the Nexlizet well.     Pt has known vitamin D deficiency.  He has been compliant with dosing of his vitamin D supplementation.     Pt has depression.  He is treated with Fluoxetine.   He continues to follow with his psychiatrist on a regular basis.               7/15/23: He is still smoking.      Has had flu vaccine this season       Review of Systems  Constitutional: Patient denies any fever, chills, loss of appetite, or unexplained weight loss.  Cardiovascular: Patient denies any chest pain, shortness of breath with exertion, tachycardia, palpitations, orthopnea, or paroxysmal nocturnal dyspnea.  Respiratory: Patient denies any cough, shortness breath, or wheezing.  Gastrointestinal: Patient denies any nausea, vomiting, diarrhea, constipation, melena, hematochezia, or reflux symptoms.  Skin: Denies any rashes or skin lesions.   Neurology: Patient denies any new motor or sensory losses.  Denies any numbness, tingling, weakness, and incoordination of the extremities.  Patient also denies any tremor, seizures,  "or gait instability.  Endocrinology: Denies any polyuria, polydipsia, polyphagia, or heat/cold intolerance.        Objective   /72   Pulse 78   Temp 36.9 °C (98.4 °F)   Ht 1.778 m (5' 10\")   Wt 88 kg (194 lb)   SpO2 96%   BMI 27.84 kg/m²     Physical Exam  General Appearance: Alert and cooperative, in no acute distress, well-developed/well-nourished.  Neck: Supple and without adenopathy or rigidity.  There is no JVD at 90° and no carotid bruits are noted.  There is no thyromegaly, thyroid tenderness, or palpable thyroid nodules.  Heart: Regular rate and rhythm without murmur or ectopy.  Respiratory: Lungs are clear to auscultation bilaterally with good air exchange.  Good respiratory effort and no accessory muscle use.  Skin: Good turgor, moist, warm and without rashes or lesions.  Neurological exam: Alert and oriented ×3, no tremor, normal gait.  Extremities: No clubbing, cyanosis, or edema      Assessment/Plan     HTN:  Blood pressure appears adequately controlled and we will continue with the current antihypertensive therapy.  Continue the current medication.     Hyperlipidemia: Stable based on his last labs.  Continue the current medication.   Dietary changes, exercise, and maintenance of healthy weight were discussed at length.  Continues to tolerate the Nexlizet well.     DM Type 2:   His most recent labs reveal an A1c of:  Lab Results   Component Value Date    HGBA1C 6.0 (H) 10/16/2023   Dietary changes, exercise, and maintenance of a healthy weight were discussed at length.  10/23/2023:  Continue off the Trulicity and take 1 metformin per day.  May need to restart Trulicity if glucose increases once his dental work is complete and he is eating more normal again.     Vit D deficiency: We will continue to monitor.   He will continue the vitamin D at a dose of 5000 units 5 days per week.     Cigarette smoker: He has unfortunately continued to smoke.   SMOKING CESSATION ONCE AGAIN " ENCOURAGED.  Patient understands that continued smoking will increase the risks of lung disease, vascular disease, and multiple malignancies.     Depression: Stable based on symptoms.   Pt will continue to follow up with his psychiatrist on a regular basis.  Continue current medications.           Colonoscopy completed in October 2022. Due for repeat in 2 years.      Orders Placed This Encounter   Procedures    Hemoglobin A1C    Basic Metabolic Panel    Vitamin D 25-Hydroxy,Total (for eval of Vitamin D levels)

## 2024-01-15 ENCOUNTER — LAB (OUTPATIENT)
Dept: LAB | Facility: LAB | Age: 54
End: 2024-01-15
Payer: COMMERCIAL

## 2024-01-15 DIAGNOSIS — I10 BENIGN ESSENTIAL HYPERTENSION: ICD-10-CM

## 2024-01-15 DIAGNOSIS — E55.9 VITAMIN D DEFICIENCY: ICD-10-CM

## 2024-01-15 DIAGNOSIS — E11.9 TYPE 2 DIABETES MELLITUS WITHOUT COMPLICATION, WITHOUT LONG-TERM CURRENT USE OF INSULIN (MULTI): ICD-10-CM

## 2024-01-15 LAB
25(OH)D3 SERPL-MCNC: 25 NG/ML (ref 30–100)
ANION GAP SERPL CALC-SCNC: 13 MMOL/L (ref 10–20)
BUN SERPL-MCNC: 11 MG/DL (ref 6–23)
CALCIUM SERPL-MCNC: 9.2 MG/DL (ref 8.6–10.3)
CHLORIDE SERPL-SCNC: 105 MMOL/L (ref 98–107)
CO2 SERPL-SCNC: 26 MMOL/L (ref 21–32)
CREAT SERPL-MCNC: 1.07 MG/DL (ref 0.5–1.3)
EGFRCR SERPLBLD CKD-EPI 2021: 84 ML/MIN/1.73M*2
EST. AVERAGE GLUCOSE BLD GHB EST-MCNC: 128 MG/DL
GLUCOSE SERPL-MCNC: 109 MG/DL (ref 74–99)
HBA1C MFR BLD: 6.1 %
POTASSIUM SERPL-SCNC: 4.5 MMOL/L (ref 3.5–5.3)
SODIUM SERPL-SCNC: 139 MMOL/L (ref 136–145)

## 2024-01-15 PROCEDURE — 83036 HEMOGLOBIN GLYCOSYLATED A1C: CPT

## 2024-01-15 PROCEDURE — 80048 BASIC METABOLIC PNL TOTAL CA: CPT

## 2024-01-15 PROCEDURE — 36415 COLL VENOUS BLD VENIPUNCTURE: CPT

## 2024-01-15 PROCEDURE — 82306 VITAMIN D 25 HYDROXY: CPT

## 2024-01-16 ENCOUNTER — APPOINTMENT (OUTPATIENT)
Dept: PRIMARY CARE | Facility: CLINIC | Age: 54
End: 2024-01-16
Payer: COMMERCIAL

## 2024-01-24 ENCOUNTER — OFFICE VISIT (OUTPATIENT)
Dept: PRIMARY CARE | Facility: CLINIC | Age: 54
End: 2024-01-24
Payer: COMMERCIAL

## 2024-01-24 VITALS
HEART RATE: 77 BPM | HEIGHT: 70 IN | OXYGEN SATURATION: 97 % | SYSTOLIC BLOOD PRESSURE: 116 MMHG | BODY MASS INDEX: 27.84 KG/M2 | TEMPERATURE: 98 F | DIASTOLIC BLOOD PRESSURE: 70 MMHG

## 2024-01-24 DIAGNOSIS — E78.2 MIXED HYPERLIPIDEMIA: ICD-10-CM

## 2024-01-24 DIAGNOSIS — I10 BENIGN ESSENTIAL HYPERTENSION: Primary | ICD-10-CM

## 2024-01-24 DIAGNOSIS — Z86.010 HISTORY OF COLON POLYPS: ICD-10-CM

## 2024-01-24 DIAGNOSIS — F17.210 CIGARETTE SMOKER: ICD-10-CM

## 2024-01-24 DIAGNOSIS — E11.9 TYPE 2 DIABETES MELLITUS WITHOUT COMPLICATION, WITHOUT LONG-TERM CURRENT USE OF INSULIN (MULTI): ICD-10-CM

## 2024-01-24 DIAGNOSIS — E55.9 VITAMIN D DEFICIENCY: ICD-10-CM

## 2024-01-24 PROBLEM — R07.9 CHEST PAIN: Status: RESOLVED | Noted: 2023-10-23 | Resolved: 2024-01-24

## 2024-01-24 PROBLEM — Z86.0100 HISTORY OF COLON POLYPS: Status: ACTIVE | Noted: 2024-01-24

## 2024-01-24 PROCEDURE — 3078F DIAST BP <80 MM HG: CPT | Performed by: FAMILY MEDICINE

## 2024-01-24 PROCEDURE — 3074F SYST BP LT 130 MM HG: CPT | Performed by: FAMILY MEDICINE

## 2024-01-24 PROCEDURE — 3044F HG A1C LEVEL LT 7.0%: CPT | Performed by: FAMILY MEDICINE

## 2024-01-24 PROCEDURE — 1036F TOBACCO NON-USER: CPT | Performed by: FAMILY MEDICINE

## 2024-01-24 PROCEDURE — 99214 OFFICE O/P EST MOD 30 MIN: CPT | Performed by: FAMILY MEDICINE

## 2024-01-24 RX ORDER — DULAGLUTIDE 1.5 MG/.5ML
1.5 INJECTION, SOLUTION SUBCUTANEOUS
Qty: 12 EACH | Refills: 0 | Status: SHIPPED | OUTPATIENT
Start: 2024-01-24 | End: 2024-02-12 | Stop reason: SDUPTHER

## 2024-01-24 RX ORDER — BLOOD-GLUCOSE METER
1 EACH MISCELLANEOUS DAILY
Qty: 100 STRIP | Refills: 3 | Status: SHIPPED | OUTPATIENT
Start: 2024-01-24

## 2024-01-24 RX ORDER — BLOOD-GLUCOSE METER
EACH MISCELLANEOUS
COMMUNITY
End: 2024-01-24 | Stop reason: SDUPTHER

## 2024-01-24 RX ORDER — ACETAMINOPHEN 500 MG
4000 TABLET ORAL DAILY
Status: SHIPPED
Start: 2024-01-24

## 2024-01-24 ASSESSMENT — PATIENT HEALTH QUESTIONNAIRE - PHQ9
SUM OF ALL RESPONSES TO PHQ9 QUESTIONS 1 AND 2: 0
1. LITTLE INTEREST OR PLEASURE IN DOING THINGS: NOT AT ALL
2. FEELING DOWN, DEPRESSED OR HOPELESS: NOT AT ALL

## 2024-01-24 NOTE — PATIENT INSTRUCTIONS
Follow up in 3 months with labs to be done PRIOR.    It was a pleasure to see you today. Thank you for choosing us for your health care needs.    If you have lab or other testing completed and have not been informed of results within one week, please call the office for your results.    If you haven't done so, consider signing up for [facility] [employer], the [facility] personal health record. Ask the staff how you can get started.

## 2024-01-24 NOTE — PROGRESS NOTES
Subjective   Patient ID: Phi Viera is a 53 y.o. male who presents for Follow-up.    HPI     Would like to discuss colonoscopy, would like a referral to see Dr. Nicholas Wu (GI).  Pt has polyps on last colonoscopy and was recommended to repeat in 2 years.  Will be due 10/2024.    Quit smoking 12/2022 as he was having dental implants and wanted to make sure things healed well.        No other concerns at this time    Patient has HTN.  He does not monitor his BP at home.   He reports that cardiology started him on amlodipine for elevated BP.  Denies chest pain, dizziness, LE swelling.      He has type 2 diabetes.  He does monitor his sugars at home & states they are improving but have not returned to previous baseline.   Pt denies any polyuria, polydipsia, polyphagia.     He has hyperlipidemia.  Patient tries to limit the amount of fatty foods and high cholesterol foods that are consumed.  He reports eating a lot of fruit recently, which he thinks may explain elevated triglyceride level.  He has been compliant with his Vascepa and denies any noted side effects.  HE HAS BEEN INTOLERANT OF MULTIPLE STATINS DUE TO MYALGIA.     He has known vitamin D deficiency.  Pt has been compliant with Vit D supplementation.     He has depression.  Symptoms have been stable.  He continues to follow with his psychiatrist on a regular basis.       Review of Systems  Constitutional: Patient denies any fever, chills, loss of appetite, or unexplained weight loss.  Cardiovascular: Patient denies any chest pain, shortness of breath with exertion, tachycardia, palpitations, orthopnea, or paroxysmal nocturnal dyspnea.  Respiratory: Patient denies any cough, shortness breath, or wheezing.  Gastrointestinal: Patient denies any nausea, vomiting, diarrhea, constipation, melena, hematochezia, or reflux symptoms.  Skin: Denies any rashes or skin lesions.   Neurology: Patient denies any new motor or sensory losses.  Denies any numbness, tingling,  "weakness, and incoordination of the extremities.  Patient also denies any tremor, seizures, or gait instability.  Endocrinology: Denies any polyuria, polydipsia, polyphagia, or heat/cold intolerance.      Objective   /70   Pulse 77   Temp 36.7 °C (98 °F)   Ht 1.778 m (5' 10\")   SpO2 97%   BMI 27.84 kg/m²     Physical Exam  General Appearance: Alert and cooperative, in no acute distress, well-developed/well-nourished.  Neck: Supple and without adenopathy or rigidity.  There is no JVD at 90° and no carotid bruits are noted.  There is no thyromegaly, thyroid tenderness, or palpable thyroid nodules.  Heart: Regular rate and rhythm without murmur or ectopy.  Respiratory: Lungs are clear to auscultation bilaterally with good air exchange.  Good respiratory effort and no accessory muscle use.  Skin: Good turgor, moist, warm and without rashes or lesions.  Neurological exam: Alert and oriented ×3, no tremor, normal gait.  Extremities: No clubbing, cyanosis, or edema    Assessment/Plan   1. Benign essential hypertension  BP is stable.  Continue the current BP medication.    2. Mixed hyperlipidemia  Stable on last labs.  Dietary changes,  routine exercise, and maintenance of a healthy weight discussed.  - Lipid Panel; Future    3. Type 2 diabetes mellitus without complication, without long-term current use of insulin (CMS/[facility])  His last A1c was:  Lab Results   Component Value Date    HGBA1C 6.1 (H) 01/15/2024   Continues to be well controlled.  We will continue with the current treatment plan.  - dulaglutide (Trulicity) 1.5 mg/0.5 mL pen injector injection; Inject 1.5 mg under the skin 1 (one) time per week.  Dispense: 12 each; Refill: 0  - blood sugar diagnostic (OneTouch Verio test strips) strip; 1 strip once daily.  Dispense: 100 strip; Refill: 3  - Hemoglobin A1C; Future  - Basic Metabolic Panel; Future  - TSH with reflex to Free T4 if abnormal; Future  - Albumin , Urine Random; Future    4. Vitamin D " deficiency  Pt to take 4000 units of vitamin D daily.    2024:  He was on 2000 units for a period of time which likely resulted in the low reading on his last lab.   - Vitamin D 25-Hydroxy,Total (for eval of Vitamin D levels); Future    5. Cigarette smoker  2024:  Pt reports that he quit smoking 2023 and I congratulated him on his efforts.  Will continue to encourage to remain tobacco free.    6. History of colon polyps  He will be due for follow up colonoscopy 10/2024.  Referral was done at his request and he can arrange at his convenience.   - Referral to Gastroenterology; Future           Orders Placed This Encounter   Procedures    Hemoglobin A1C    Basic Metabolic Panel    Lipid Panel    Vitamin D 25-Hydroxy,Total (for eval of Vitamin D levels)    TSH with reflex to Free T4 if abnormal    Albumin , Urine Random    Referral to Gastroenterology     Requested Prescriptions     Signed Prescriptions Disp Refills    dulaglutide (Trulicity) 1.5 mg/0.5 mL pen injector injection 12 each 0     Sig: Inject 1.5 mg under the skin 1 (one) time per week.    blood sugar diagnostic (OneTouch Verio test strips) strip 100 strip 3     Si strip once daily.    cholecalciferol (Vitamin D-3) 50 mcg (2,000 unit) capsule       Sig: Take 2 capsules (100 mcg) by mouth early in the morning..

## 2024-02-05 DIAGNOSIS — I10 ESSENTIAL HYPERTENSION, BENIGN: ICD-10-CM

## 2024-02-05 PROBLEM — Z82.49 FAMILY HISTORY OF ISCHEMIC HEART DISEASE: Status: ACTIVE | Noted: 2024-02-05

## 2024-02-05 RX ORDER — AMLODIPINE BESYLATE 5 MG/1
5 TABLET ORAL DAILY
Qty: 90 TABLET | Refills: 3 | Status: SHIPPED | OUTPATIENT
Start: 2024-02-05 | End: 2025-02-04

## 2024-02-10 ENCOUNTER — PATIENT MESSAGE (OUTPATIENT)
Dept: PRIMARY CARE | Facility: CLINIC | Age: 54
End: 2024-02-10
Payer: COMMERCIAL

## 2024-02-10 DIAGNOSIS — E11.9 TYPE 2 DIABETES MELLITUS WITHOUT COMPLICATION, WITHOUT LONG-TERM CURRENT USE OF INSULIN (MULTI): ICD-10-CM

## 2024-02-12 DIAGNOSIS — E11.9 TYPE 2 DIABETES MELLITUS WITHOUT COMPLICATION, WITHOUT LONG-TERM CURRENT USE OF INSULIN (MULTI): ICD-10-CM

## 2024-02-12 RX ORDER — DULAGLUTIDE 1.5 MG/.5ML
1.5 INJECTION, SOLUTION SUBCUTANEOUS
Qty: 12 EACH | Refills: 0 | Status: SHIPPED | OUTPATIENT
Start: 2024-02-12 | End: 2024-04-20 | Stop reason: SDUPTHER

## 2024-02-13 RX ORDER — DULAGLUTIDE 1.5 MG/.5ML
INJECTION, SOLUTION SUBCUTANEOUS
OUTPATIENT
Start: 2024-02-13

## 2024-02-14 NOTE — TELEPHONE ENCOUNTER
I did receive a message earlier stating that is was back ordered and they needed an alternative. I also just spoke to [facility] care deborah on phone was told the same.

## 2024-02-19 ENCOUNTER — OFFICE VISIT (OUTPATIENT)
Dept: GASTROENTEROLOGY | Facility: CLINIC | Age: 54
End: 2024-02-19
Payer: COMMERCIAL

## 2024-02-19 VITALS
BODY MASS INDEX: 29.41 KG/M2 | DIASTOLIC BLOOD PRESSURE: 78 MMHG | TEMPERATURE: 98.2 F | HEART RATE: 75 BPM | SYSTOLIC BLOOD PRESSURE: 117 MMHG | WEIGHT: 205 LBS

## 2024-02-19 DIAGNOSIS — R11.10 REGURGITATION OF FOOD: ICD-10-CM

## 2024-02-19 DIAGNOSIS — K59.09 CHRONIC CONSTIPATION: ICD-10-CM

## 2024-02-19 DIAGNOSIS — R09.A2 GLOBUS SENSATION: ICD-10-CM

## 2024-02-19 DIAGNOSIS — T17.308A CHOKING, INITIAL ENCOUNTER: Primary | ICD-10-CM

## 2024-02-19 DIAGNOSIS — Z86.010 HISTORY OF COLON POLYPS: ICD-10-CM

## 2024-02-19 PROCEDURE — 99203 OFFICE O/P NEW LOW 30 MIN: CPT | Performed by: NURSE PRACTITIONER

## 2024-02-19 PROCEDURE — 99213 OFFICE O/P EST LOW 20 MIN: CPT | Performed by: NURSE PRACTITIONER

## 2024-02-19 PROCEDURE — 3074F SYST BP LT 130 MM HG: CPT | Performed by: NURSE PRACTITIONER

## 2024-02-19 PROCEDURE — 1036F TOBACCO NON-USER: CPT | Performed by: NURSE PRACTITIONER

## 2024-02-19 PROCEDURE — 3078F DIAST BP <80 MM HG: CPT | Performed by: NURSE PRACTITIONER

## 2024-02-19 PROCEDURE — 3044F HG A1C LEVEL LT 7.0%: CPT | Performed by: NURSE PRACTITIONER

## 2024-02-19 RX ORDER — DOCUSATE SODIUM 100 MG/1
100 CAPSULE, LIQUID FILLED ORAL DAILY
Qty: 60 CAPSULE | Refills: 0 | Status: SHIPPED | OUTPATIENT
Start: 2024-02-19

## 2024-02-19 RX ORDER — WHEAT DEXTRIN 3 G/4 G
POWDER (GRAM) ORAL
Qty: 248 G | Refills: 1 | Status: SHIPPED | OUTPATIENT
Start: 2024-02-19

## 2024-02-19 ASSESSMENT — ENCOUNTER SYMPTOMS
FATIGUE: 0
DIFFICULTY URINATING: 0
APNEA: 0
DIAPHORESIS: 0
FEVER: 0
PSYCHIATRIC NEGATIVE: 1
COUGH: 0
RESPIRATORY NEGATIVE: 1
STRIDOR: 0
CHILLS: 0
CHEST TIGHTNESS: 0
ROS GI COMMENTS: SEE HPI
WHEEZING: 0
ENDOCRINE NEGATIVE: 1
MUSCULOSKELETAL NEGATIVE: 1
ALLERGIC/IMMUNOLOGIC NEGATIVE: 1
CARDIOVASCULAR NEGATIVE: 1
HEMATOLOGIC/LYMPHATIC NEGATIVE: 1
NEUROLOGICAL NEGATIVE: 1
SHORTNESS OF BREATH: 0
EYES NEGATIVE: 1

## 2024-02-19 ASSESSMENT — PAIN SCALES - GENERAL: PAINLEVEL: 0-NO PAIN

## 2024-02-19 NOTE — PROGRESS NOTES
Subjective   Patient ID: Phi Viera is a 53 y.o. male who presents for Colon Cancer Screening. PMH: Type II DM, HTN, HLD  Presents today for a colonoscopy  Changed his diet and now has some constipation  He is drinking enough water but does have a hemorrhoid    He has a BM daily, he reports intermittent bright red blood, he denies rectal pain, he thinks he has hemorrhoids, he denies vomiting, abdominal pain, has regurgitation (this is occurring daily) he denies dysphagia, odynophagia. He denies heartburn.     Family Hx: Mother had pancreatic cancer  M. Uncle with lung cancer  M. Great grandmother with a cancer of unknown     Social Hx: Smoked for 35 years, quit 4 months, he denies ETOH use, denies drug use       Colonoscopy in 2022 found seven 4-10 mm polyps (SSA and TA)  Review of Systems   Constitutional:  Negative for chills, diaphoresis, fatigue and fever.   HENT: Negative.     Eyes: Negative.    Respiratory: Negative.  Negative for apnea, cough, chest tightness, shortness of breath, wheezing and stridor.    Cardiovascular: Negative.    Gastrointestinal:         See HPI    Endocrine: Negative.    Genitourinary: Negative.  Negative for difficulty urinating.   Musculoskeletal: Negative.    Skin: Negative.    Allergic/Immunologic: Negative.    Neurological: Negative.    Hematological: Negative.    Psychiatric/Behavioral: Negative.         Objective   Physical Exam  Constitutional:       Appearance: He is normal weight.   Neurological:      Mental Status: He is alert.   Psychiatric:         Mood and Affect: Mood normal.       Assessment/Plan   Diagnoses and all orders for this visit:  Choking, initial encounter  -     FL GI esophagram; Future  -     EGD; Future  -     wheat dextrin (Benefiber Sugar Free, dextrin,) 3 gram/4 gram powder; Take 1 teaspoon daily with a full glass of water (8 ounces)  -     docusate sodium (Colace) 100 mg capsule; Take 1 capsule (100 mg) by mouth once daily.  History of colon polyps  -      Referral to Gastroenterology  -     Colonoscopy Screening; High Risk Patient; 7 polyps on last colonoscopy; Future  Globus sensation  -     EGD; Future  Regurgitation of food  -     EGD; Future  Chronic constipation     53 year old male with a PMH of type II DM, sleep apnea, HLD who presents today to discuss a colonoscopy. Last colonoscopy in 2022 found 7 Ta's/SSA and recommended repeat in 2 years with 2-day bowel prep. Of note, Mother had pancreatic cancer (heavy drinker) and m. Great grandmother also had a cancer of unknown origin. He did have 7 TA/SSA on colonoscopy and if he were to have additional adenomatous polyps on next colonoscopy would refer to genetics.     For regurgitation, coughing up water and suspected globus sensation he will complete an esophagram and EGD (based on results)    He also reports hemorrhoids and will start fiber and colace.   He will follow up after colonoscopy.    SENAIT Rod-CNP 02/19/24 12:53 PM

## 2024-02-19 NOTE — PATIENT INSTRUCTIONS
Complete swallow tests    You will be scheduled for a colonoscopy.  Please read all of the instructions 7 days before your colonoscopy.  You will need to take ONLY clear liquids the ENTIRE TWO day before your procedure. These include (clear fruit juices, soda, Gatorade, broth, jello and coffee/tea) Avoid red and purple drinks. No cream or milk in the coffee.  You will need to take a bowel preparation.  You will also need a .      To schedule a procedure please call [phone]     For hemorrhoids:  Start benefiber (one teaspoon) daily with a full glass of water    Start colace       After colonoscopy please return so we can discuss a genetics referral

## 2024-02-23 DIAGNOSIS — Z12.11 COLON CANCER SCREENING: ICD-10-CM

## 2024-02-23 RX ORDER — POLYETHYLENE GLYCOL 3350, SODIUM CHLORIDE, SODIUM BICARBONATE, POTASSIUM CHLORIDE 420; 11.2; 5.72; 1.48 G/4L; G/4L; G/4L; G/4L
4000 POWDER, FOR SOLUTION ORAL ONCE
Qty: 4000 ML | Refills: 0 | Status: SHIPPED | OUTPATIENT
Start: 2024-02-23 | End: 2024-02-23

## 2024-02-28 RX ORDER — POLYETHYLENE GLYCOL 3350, SODIUM CHLORIDE, SODIUM BICARBONATE, POTASSIUM CHLORIDE 420; 11.2; 5.72; 1.48 G/4L; G/4L; G/4L; G/4L
4000 POWDER, FOR SOLUTION ORAL SEE ADMIN INSTRUCTIONS
Qty: 8000 ML | Refills: 0 | Status: SHIPPED | OUTPATIENT
Start: 2024-02-28 | End: 2024-03-18 | Stop reason: ALTCHOICE

## 2024-03-04 ENCOUNTER — ANESTHESIA EVENT (OUTPATIENT)
Dept: GASTROENTEROLOGY | Facility: EXTERNAL LOCATION | Age: 54
End: 2024-03-04

## 2024-03-11 ENCOUNTER — HOSPITAL ENCOUNTER (OUTPATIENT)
Dept: RADIOLOGY | Facility: HOSPITAL | Age: 54
Discharge: HOME | End: 2024-03-11
Payer: COMMERCIAL

## 2024-03-11 DIAGNOSIS — T17.308A CHOKING, INITIAL ENCOUNTER: ICD-10-CM

## 2024-03-11 PROCEDURE — A9698 NON-RAD CONTRAST MATERIALNOC: HCPCS | Performed by: FAMILY MEDICINE

## 2024-03-11 PROCEDURE — 2500000001 HC RX 250 WO HCPCS SELF ADMINISTERED DRUGS (ALT 637 FOR MEDICARE OP): Performed by: FAMILY MEDICINE

## 2024-03-11 PROCEDURE — 3430000001 HC RX 343 DIAGNOSTIC RADIOPHARMACEUTICALS: Performed by: FAMILY MEDICINE

## 2024-03-11 PROCEDURE — 74220 X-RAY XM ESOPHAGUS 1CNTRST: CPT

## 2024-03-11 PROCEDURE — 74221 X-RAY XM ESOPHAGUS 2CNTRST: CPT | Performed by: RADIOLOGY

## 2024-03-11 RX ADMIN — BARIUM SULFATE 90 ML: 960 POWDER, FOR SUSPENSION ORAL at 09:02

## 2024-03-11 RX ADMIN — BARIUM SULFATE 50 ML: 980 POWDER, FOR SUSPENSION ORAL at 09:02

## 2024-03-12 ENCOUNTER — PATIENT MESSAGE (OUTPATIENT)
Dept: PRIMARY CARE | Facility: CLINIC | Age: 54
End: 2024-03-12
Payer: COMMERCIAL

## 2024-03-18 ENCOUNTER — HOSPITAL ENCOUNTER (OUTPATIENT)
Dept: GASTROENTEROLOGY | Facility: EXTERNAL LOCATION | Age: 54
Discharge: HOME | End: 2024-03-18
Payer: COMMERCIAL

## 2024-03-18 ENCOUNTER — ANESTHESIA (OUTPATIENT)
Dept: GASTROENTEROLOGY | Facility: EXTERNAL LOCATION | Age: 54
End: 2024-03-18

## 2024-03-18 VITALS
WEIGHT: 200 LBS | HEART RATE: 81 BPM | SYSTOLIC BLOOD PRESSURE: 106 MMHG | RESPIRATION RATE: 14 BRPM | DIASTOLIC BLOOD PRESSURE: 64 MMHG | BODY MASS INDEX: 28.63 KG/M2 | HEIGHT: 70 IN | OXYGEN SATURATION: 99 % | TEMPERATURE: 98.1 F

## 2024-03-18 DIAGNOSIS — Z86.010 HISTORY OF COLON POLYPS: ICD-10-CM

## 2024-03-18 DIAGNOSIS — R11.10 REGURGITATION OF FOOD: ICD-10-CM

## 2024-03-18 DIAGNOSIS — R09.A2 GLOBUS SENSATION: ICD-10-CM

## 2024-03-18 DIAGNOSIS — T17.308A CHOKING, INITIAL ENCOUNTER: ICD-10-CM

## 2024-03-18 DIAGNOSIS — K59.09 CHRONIC CONSTIPATION: ICD-10-CM

## 2024-03-18 PROCEDURE — 88305 TISSUE EXAM BY PATHOLOGIST: CPT | Performed by: PATHOLOGY

## 2024-03-18 PROCEDURE — 43239 EGD BIOPSY SINGLE/MULTIPLE: CPT | Performed by: INTERNAL MEDICINE

## 2024-03-18 PROCEDURE — 88305 TISSUE EXAM BY PATHOLOGIST: CPT

## 2024-03-18 PROCEDURE — 45385 COLONOSCOPY W/LESION REMOVAL: CPT | Performed by: INTERNAL MEDICINE

## 2024-03-18 RX ORDER — LIDOCAINE HYDROCHLORIDE 20 MG/ML
INJECTION, SOLUTION INFILTRATION; PERINEURAL AS NEEDED
Status: DISCONTINUED | OUTPATIENT
Start: 2024-03-18 | End: 2024-03-18

## 2024-03-18 RX ORDER — SODIUM CHLORIDE 9 MG/ML
20 INJECTION, SOLUTION INTRAVENOUS CONTINUOUS
Status: CANCELLED | OUTPATIENT
Start: 2024-03-18

## 2024-03-18 RX ORDER — PROPOFOL 10 MG/ML
INJECTION, EMULSION INTRAVENOUS AS NEEDED
Status: DISCONTINUED | OUTPATIENT
Start: 2024-03-18 | End: 2024-03-18

## 2024-03-18 RX ORDER — ONDANSETRON HYDROCHLORIDE 2 MG/ML
4 INJECTION, SOLUTION INTRAVENOUS ONCE AS NEEDED
Status: CANCELLED | OUTPATIENT
Start: 2024-03-18

## 2024-03-18 RX ADMIN — LIDOCAINE HYDROCHLORIDE 100 ML: 20 INJECTION, SOLUTION INFILTRATION; PERINEURAL at 10:50

## 2024-03-18 RX ADMIN — PROPOFOL 50 MG: 10 INJECTION, EMULSION INTRAVENOUS at 10:57

## 2024-03-18 RX ADMIN — PROPOFOL 150 MG: 10 INJECTION, EMULSION INTRAVENOUS at 10:50

## 2024-03-18 RX ADMIN — PROPOFOL 50 MG: 10 INJECTION, EMULSION INTRAVENOUS at 11:14

## 2024-03-18 RX ADMIN — PROPOFOL 50 MG: 10 INJECTION, EMULSION INTRAVENOUS at 11:01

## 2024-03-18 RX ADMIN — PROPOFOL 50 MG: 10 INJECTION, EMULSION INTRAVENOUS at 11:07

## 2024-03-18 RX ADMIN — PROPOFOL 50 MG: 10 INJECTION, EMULSION INTRAVENOUS at 10:54

## 2024-03-18 SDOH — HEALTH STABILITY: MENTAL HEALTH: CURRENT SMOKER: 1

## 2024-03-18 ASSESSMENT — PAIN - FUNCTIONAL ASSESSMENT
PAIN_FUNCTIONAL_ASSESSMENT: 0-10

## 2024-03-18 ASSESSMENT — PAIN SCALES - GENERAL
PAINLEVEL_OUTOF10: 0 - NO PAIN
PAINLEVEL_OUTOF10: 0 - NO PAIN
PAIN_LEVEL: 0
PAINLEVEL_OUTOF10: 0 - NO PAIN
PAINLEVEL_OUTOF10: 0 - NO PAIN

## 2024-03-18 ASSESSMENT — COLUMBIA-SUICIDE SEVERITY RATING SCALE - C-SSRS
6. HAVE YOU EVER DONE ANYTHING, STARTED TO DO ANYTHING, OR PREPARED TO DO ANYTHING TO END YOUR LIFE?: NO
1. IN THE PAST MONTH, HAVE YOU WISHED YOU WERE DEAD OR WISHED YOU COULD GO TO SLEEP AND NOT WAKE UP?: NO
2. HAVE YOU ACTUALLY HAD ANY THOUGHTS OF KILLING YOURSELF?: NO

## 2024-03-18 NOTE — DISCHARGE INSTRUCTIONS
Patient Instructions Post Procedure      The anesthetics, sedatives or narcotics which were given to you today will be acting in your body for the next 24 hours, so you might feel a little sleepy or groggy.  This feeling should slowly wear off. Carefully read and follow the instructions.     You received sedation today:  - Do not drive or operate any machinery or power tools of any kind.   - No alcoholic beverages today, not even beer or wine.  - Do not make any important decisions or sign any legal documents.  - No over the counter medications that contain alcohol or that may cause drowsiness.    While it is common to experience mild to moderate abdominal distention, gas, or belching after your procedure, if any of these symptoms occur following discharge from the GI Lab or within one week of having your procedure, call the [facility] to be advised whether a visit to your nearest Urgent Care or Emergency Department is indicated.  Take this paper with you if you go.   - If you develop an allergic reaction to the medications that were given during your procedure such as difficulty breathing, rash, hives, severe nausea, vomiting or lightheadedness.  - If you experience chest pain, shortness of breath, severe abdominal pain, fevers and chills.  -If you develop signs and symptoms of bleeding such as blood in your spit, if your stools turn black, tarry, or bloody  - If you have not urinated within 8 hours following your procedure.  - If your IV site becomes painful, red, inflamed, or looks infected.    If you received a biopsy/polypectomy/sphincterotomy the following instructions apply below:  __ Do not use Aspirin containing products, non-steroidal medications or anti-coagulants for one week following your procedure. (Examples of these types of medications are: Advil, Arthrotec, Aleve, Coumadin, Ecotrin, Heparin, Ibuprofen, Indocin, Motrin, Naprosyn, Nuprin, Plavix, Vioxx, and Voltarin, or their generic  forms.  This list is not all-inclusive.  Check with your physician or pharmacist before resuming medications.)   __ Eat a soft diet today.  Avoid foods that are poorly digested for the next 24 hours.  These foods would include: nuts, beans, lettuce, red meats, and fried foods. Start with liquids and advance your diet as tolerated, gradually work up to eating solids.   __ Do not have a Barium Study or Enema for one week.    Your physician recommends the additional following instructions:    -You have a contact number available for emergencies. The signs and symptoms of potential delayed complications were discussed with you. You may return to normal activities tomorrow.  -Resume your previous diet or other if specified.  -Continue your present medications.   -We are waiting for your pathology results, if applicable.  -The findings and recommendations have been discussed with you and/or family.  - Please see Medication Reconciliation Form for new medication/medications prescribed.     If you experience any problems or have any questions following discharge from the GI Lab, please call: [phone] from 7 am- 4:30 pm.  In the event of an emergency please go to the closest Emergency Department or call Dr. Gonzalez @ [phone]

## 2024-03-18 NOTE — ANESTHESIA PREPROCEDURE EVALUATION
Patient: Phi Viera    Procedure Information       Date/Time: 03/18/24 1030    Scheduled providers: Owen Gonzalez MD; Tarah Quintero RN; Kimber Montemayor MA; SENAIT Garcia-CRNA    Procedures:       EGD      COLONOSCOPY    Location: [facility]            Relevant Problems   Cardiovascular   (+) Benign essential hypertension   (+) Hyperlipidemia      Endocrine   (+) Obesity      /Renal   (+) Fatty liver      Neuro/Psych   (+) Anxiety   (+) Depressive disorder   (+) Recurrent major depressive disorder (CMS/HCC)      Pulmonary   (+) EMI (obstructive sleep apnea)      GI/Hepatic   (+) Fatty liver       Clinical information reviewed:                   NPO Detail:  No data recorded     Physical Exam    Airway  Mallampati: II  TM distance: >3 FB  Neck ROM: full     Cardiovascular - normal exam     Dental - normal exam     Pulmonary - normal exam  Breath sounds clear to auscultation     Abdominal            Anesthesia Plan    History of general anesthesia?: yes  History of complications of general anesthesia?: no    ASA 2     MAC     The patient is a current smoker.  Patient was previously instructed to abstain from smoking on day of procedure.  Patient did not smoke on day of procedure.    intravenous induction   Anesthetic plan and risks discussed with patient.    Plan discussed with CRNA.

## 2024-03-18 NOTE — ANESTHESIA POSTPROCEDURE EVALUATION
Patient: Phi Viera    Procedure Summary       Date: 03/18/24 Room / Location: [facility]    Anesthesia Start: 1048 Anesthesia Stop:     Procedures:       EGD      COLONOSCOPY Diagnosis:       Choking, initial encounter      Globus sensation      Regurgitation of food      History of colon polyps      Chronic constipation    Scheduled Providers: Owen Gonzalez MD; Tarah Quintero RN; Kimber Montemayor MA; JASE Garcia Responsible Provider: JASE Garcia    Anesthesia Type: MAC ASA Status: 2            Anesthesia Type: MAC    Vitals Value Taken Time   /73 03/18/24 1126   Temp 36.6 03/18/24 1126   Pulse 80 03/18/24 1126   Resp 17 03/18/24 1126   SpO2 100 03/18/24 1126       Anesthesia Post Evaluation    Patient location during evaluation: bedside  Patient participation: complete - patient cannot participate  Level of consciousness: awake and responsive to verbal stimuli  Pain score: 0  Pain management: adequate  Airway patency: patent  Cardiovascular status: acceptable and hemodynamically stable  Respiratory status: acceptable  Hydration status: acceptable  Postoperative Nausea and Vomiting: none        No notable events documented.

## 2024-03-18 NOTE — H&P
Outpatient Hospital Procedure H&P    Patient Profile-Procedures  Initial Info  Patient Demographics  Name Phi Viera  Date of Birth 1970  MRN [id number]  Address   [address] [id number] [facility] [place] [address]    Primary Phone Number [phone]  Secondary Phone Number    PCP Stan Jensen    Procedure(s):  EGD, colonoscopy  Primary contact name and number   Extended Emergency Contact Information  Primary Emergency Contact: Juliane Viera  Address: [address] [place] [address] [facility]  Mobile Phone: [phone]  Relation: Spouse  Preferred language: English  Secondary Emergency Contact: Phi Viera  Address: [address] [place] [address] [place]  Mobile Phone: [phone]  Relation: Child  Preferred language: English   needed? No    General Health  Weight   Vitals:    03/18/24 1025   Weight: 90.7 kg (200 lb)     BMI Body mass index is 28.7 kg/m².    Allergies  Allergies   Allergen Reactions    Iodides Hives     Rash, Hives, Mentation change    Atorvastatin Other    Fluvastatin Other    Iodinated Contrast Media Hives    Lovastatin Other    Pravastatin Other    Rosuvastatin Other    Simvastatin Other    Statins-Hmg-Coa Reductase Inhibitors Other     myalgias       Past Medical History   Past Medical History:   Diagnosis Date    Depression     Hyperlipidemia     Personal history of other diseases of urinary system 05/11/2016    History of hematuria    Personal history of other mental and behavioral disorders     History of attention deficit disorder       Provider assessment  Diagnosis: Dysphagia, h/o polyps    Medication Reviewed - yes  Prior to Admission medications    Medication Sig Start Date End Date Taking? Authorizing Provider   amLODIPine (Norvasc) 5 mg tablet Take 1 tablet (5 mg) by mouth once daily. 2/5/24 2/4/25 Yes Theo Simeon MD   aspirin 81 mg EC tablet Take 1 tablet (81 mg) by mouth once daily.    Yes Historical Provider, MD storympedoic acid-ezetimibe (Nexlizet) 180-10 mg tablet Take 1 tablet by mouth once daily. 8/9/22  Yes Historical Provider, MD   cholecalciferol (Vitamin D-3) 50 mcg (2,000 unit) capsule Take 2 capsules (100 mcg) by mouth early in the morning.. 1/24/24  Yes Stan Jensen DO   cyclobenzaprine (Flexeril) 10 mg tablet Take 1 tablet (10 mg) by mouth 3 times a day as needed for muscle spasms. 10/14/14  Yes Historical Provider, MD   docusate sodium (Colace) 100 mg capsule Take 1 capsule (100 mg) by mouth once daily. 2/19/24  Yes RAMIRO Rod   dulaglutide (Trulicity) 1.5 mg/0.5 mL pen injector injection Inject 1.5 mg under the skin 1 (one) time per week. 2/12/24 5/12/24 Yes Deja Bonds PA-C   FLUoxetine (PROzac) 20 mg tablet Take 1 tablet (20 mg) by mouth once daily.   Yes Historical Provider, MD   fluticasone (Flonase) 50 mcg/actuation nasal spray Administer 2 sprays into affected nostril(s) once daily. 6/19/21  Yes Historical Provider, MD   lamoTRIgine (LaMICtal) 25 mg tablet Take 2 tablets (50 mg) by mouth once daily. 5/9/22  Yes Historical Provider, MD   risperiDONE (RisperDAL) 1 mg tablet Take 1 tablet (1 mg) by mouth 2 times a day.   Yes Historical Provider, MD   wheat dextrin (Benefiber Sugar Free, dextrin,) 3 gram/4 gram powder Take 1 teaspoon daily with a full glass of water (8 ounces) 2/19/24  Yes RAMIRO Rod   blood sugar diagnostic (OneTouch Verio test strips) strip 1 strip once daily. 1/24/24   Stan Jensen DO   metFORMIN XR (Glucophage-XR) 500 mg 24 hr tablet Take 2 tablets (1,000 mg) by mouth once daily in the evening. Take with meals. 7/15/23 10/13/23  Stan Jensen DO   Vascepa 1 gram capsule Take 2 capsules (2 g) by mouth 2 times a day. 7/15/23 10/13/23  Stan Jensen,    multivitamin tablet Take 1 tablet by mouth once daily.  3/13/24  Historical Provider, MD   polyethylene glycol-electrolytes 420 gram solution Take 4,000 mL by mouth see  administration instructions for 2 doses. 2/28/24 3/18/24  Owen Gonzalez MD   semaglutide 0.25 mg or 0.5 mg (2 mg/3 mL) pen injector Inject 0.5 mg under the skin 1 (one) time per week. Start after 4 weeks at the 0.25 mg weekly dose.  Patient not taking: Reported on 2/19/2024 2/14/24 3/13/24  Stan Jensen, DO       Physical Exam  Vitals:    03/18/24 1025   BP: 118/71   Pulse: 70   Resp: 10   Temp: 36.8 °C (98.2 °F)        General: A&Ox3, NAD.  CV: RRR. No murmur.  Resp: CTA bilaterally. No wheezing, rhonchi or rales.   Extrem: No edema.       Oropharyngeal Classification II (hard and soft palate, upper portion of tonsils anduvula visible)  ASA PS Classification 2  Sedation Plan Deep  Procedure Plan - pre-procedural (re)assesment completed by physician:  discharge/transfer patient when discharge criteria met    Owen Gonzalez MD  3/18/2024 10:46 AM

## 2024-03-22 ENCOUNTER — APPOINTMENT (OUTPATIENT)
Dept: GASTROENTEROLOGY | Facility: EXTERNAL LOCATION | Age: 54
End: 2024-03-22
Payer: COMMERCIAL

## 2024-03-25 LAB
LABORATORY COMMENT REPORT: NORMAL
PATH REPORT.FINAL DX SPEC: NORMAL
PATH REPORT.GROSS SPEC: NORMAL
PATH REPORT.TOTAL CANCER: NORMAL

## 2024-03-27 ENCOUNTER — TELEPHONE (OUTPATIENT)
Dept: GASTROENTEROLOGY | Facility: CLINIC | Age: 54
End: 2024-03-27
Payer: COMMERCIAL

## 2024-03-27 DIAGNOSIS — D36.9 ADENOMATOUS POLYPS: Primary | ICD-10-CM

## 2024-03-27 NOTE — TELEPHONE ENCOUNTER
"Result Communication    Resulted Orders   Surgical Pathology Exam   Result Value Ref Range    Case Report       Surgical Pathology                                Case: [id number]                                  Authorizing Provider:  Owen Gonzalez MD          Collected:           03/18/2024 1052              Ordering Location:     [facility] Received:            03/18/2024 1942              Pathologist:           Ian Andrews MD                                                             Specimens:   A) - ESOPHAGUS DISTAL BIOPSY, r/o eoe                                                               B) - ESOPHAGUS PROXIMAL BIOPSY, r/o EOE                                                             C) - COLON - HEPATIC FLEXURE POLYP                                                                  D) - COLON - ASCENDING POLYP                                                                        E) - COLON - TRANSVERSE POLYP, polyp x 2                                                   FINAL DIAGNOSIS       A.  DISTAL ESOPHAGUS, BIOPSY:  - SQUAMOUS EPITHELIUM, NO SIGNIFICANT HISTOPATHOLOGICAL ABNORMALITIES.    B.  PROXIMAL ESOPHAGUS, BIOPSY:  - SQUAMOUS EPITHELIUM, NO SIGNIFICANT HISTOPATHOLOGICAL ABNORMALITIES.    C.  HEPATIC FLEXURE OF COLON, POLYPECTOMY:  - TUBULAR ADENOMA.    D.  ASCENDING COLON, POLYPECTOMY:  - TUBULAR ADENOMA.    E.  TRANSVERSE COLON, POLYPECTOMY:  - TUBULAR ADENOMA.              By the signature on this report, the individual or group listed as making the Final Interpretation/Diagnosis certifies that they have reviewed this case.       Gross Description       A: Received in formalin, labeled with the patient's name and hospital number and \"esophagus distal BX\", are 2 fragments of tan, soft tissue aggregating to 0.5 x 0.5 x 0.2 cm. The specimen is submitted in toto in one cassette.  LMP  B: Received in formalin, labeled with the patient's name and hospital number and \"esophagus " "proximal BX\", are 2 fragments of tan, soft tissue aggregating to 0.7 x 0.2 x 0.1 cm. The specimen is submitted in toto in one cassette.  LMP  C: Received in formalin, labeled with the patient's name and hospital number and \"hepatic flexure polyp\", is one light tan polypoid, soft tissue fragment measuring 0.8 x 0.2 x 0.2 cm.  The specimen is submitted in toto in one cassette.  LMP  D: Received in formalin, labeled with the patient's name and hospital number and \"ascending polyp\", is one light tan polypoid, soft tissue fragment measuring 0.8 x 0.5 x 0.3 cm.  The specimen is submitted in toto in one cassette.  LMP  E: Received in formalin, labeled with the patient's name and hospital number and \"transverse polyp\", is one light tan polypoid, soft tissue fragment measuring 0.5 x 0.3 x 0.3 cm.  The specimen is submitted in toto in one cassette.  LMP         11:53 AM      Results were successfully communicated with the patient and they did not acknowledge their understanding.  Left a message, colonoscopy again in 3 years, follow up in GI PRN  I put a referral for genetics since he has had 10+ adenomatous polyps  "

## 2024-04-01 ENCOUNTER — LAB (OUTPATIENT)
Dept: LAB | Facility: LAB | Age: 54
End: 2024-04-01
Payer: COMMERCIAL

## 2024-04-01 DIAGNOSIS — E78.2 MIXED HYPERLIPIDEMIA: ICD-10-CM

## 2024-04-01 DIAGNOSIS — E55.9 VITAMIN D DEFICIENCY: ICD-10-CM

## 2024-04-01 DIAGNOSIS — E11.9 TYPE 2 DIABETES MELLITUS WITHOUT COMPLICATION, WITHOUT LONG-TERM CURRENT USE OF INSULIN (MULTI): ICD-10-CM

## 2024-04-01 LAB
25(OH)D3 SERPL-MCNC: 32 NG/ML (ref 30–100)
ANION GAP SERPL CALC-SCNC: 12 MMOL/L (ref 10–20)
BUN SERPL-MCNC: 10 MG/DL (ref 6–23)
CALCIUM SERPL-MCNC: 9.1 MG/DL (ref 8.6–10.3)
CHLORIDE SERPL-SCNC: 103 MMOL/L (ref 98–107)
CHOLEST SERPL-MCNC: 124 MG/DL (ref 0–199)
CHOLESTEROL/HDL RATIO: 3.4
CO2 SERPL-SCNC: 27 MMOL/L (ref 21–32)
CREAT SERPL-MCNC: 1.13 MG/DL (ref 0.5–1.3)
CREAT UR-MCNC: 61.6 MG/DL (ref 20–370)
EGFRCR SERPLBLD CKD-EPI 2021: 78 ML/MIN/1.73M*2
EST. AVERAGE GLUCOSE BLD GHB EST-MCNC: 111 MG/DL
GLUCOSE SERPL-MCNC: 119 MG/DL (ref 74–99)
HBA1C MFR BLD: 5.5 %
HDLC SERPL-MCNC: 36.9 MG/DL
LDLC SERPL CALC-MCNC: 44 MG/DL
MICROALBUMIN UR-MCNC: <7 MG/L
MICROALBUMIN/CREAT UR: NORMAL MG/G{CREAT}
NON HDL CHOLESTEROL: 87 MG/DL (ref 0–149)
POTASSIUM SERPL-SCNC: 4 MMOL/L (ref 3.5–5.3)
SODIUM SERPL-SCNC: 138 MMOL/L (ref 136–145)
TRIGL SERPL-MCNC: 215 MG/DL (ref 0–149)
TSH SERPL-ACNC: 0.48 MIU/L (ref 0.44–3.98)
VLDL: 43 MG/DL (ref 0–40)

## 2024-04-01 PROCEDURE — 36415 COLL VENOUS BLD VENIPUNCTURE: CPT

## 2024-04-01 PROCEDURE — 80048 BASIC METABOLIC PNL TOTAL CA: CPT

## 2024-04-01 PROCEDURE — 83036 HEMOGLOBIN GLYCOSYLATED A1C: CPT

## 2024-04-01 PROCEDURE — 80061 LIPID PANEL: CPT

## 2024-04-01 PROCEDURE — 82306 VITAMIN D 25 HYDROXY: CPT

## 2024-04-01 PROCEDURE — 82043 UR ALBUMIN QUANTITATIVE: CPT

## 2024-04-01 PROCEDURE — 82570 ASSAY OF URINE CREATININE: CPT

## 2024-04-01 PROCEDURE — 84443 ASSAY THYROID STIM HORMONE: CPT

## 2024-04-15 ENCOUNTER — OFFICE VISIT (OUTPATIENT)
Dept: GASTROENTEROLOGY | Facility: CLINIC | Age: 54
End: 2024-04-15
Payer: COMMERCIAL

## 2024-04-15 VITALS
WEIGHT: 211 LBS | TEMPERATURE: 97.9 F | BODY MASS INDEX: 30.28 KG/M2 | HEART RATE: 68 BPM | SYSTOLIC BLOOD PRESSURE: 114 MMHG | DIASTOLIC BLOOD PRESSURE: 72 MMHG

## 2024-04-15 DIAGNOSIS — D36.9 MULTIPLE ADENOMATOUS POLYPS: ICD-10-CM

## 2024-04-15 DIAGNOSIS — K21.9 GASTROESOPHAGEAL REFLUX DISEASE, UNSPECIFIED WHETHER ESOPHAGITIS PRESENT: Primary | ICD-10-CM

## 2024-04-15 DIAGNOSIS — Z72.0 TOBACCO USE: ICD-10-CM

## 2024-04-15 PROCEDURE — 3062F POS MACROALBUMINURIA REV: CPT | Performed by: NURSE PRACTITIONER

## 2024-04-15 PROCEDURE — 3048F LDL-C <100 MG/DL: CPT | Performed by: NURSE PRACTITIONER

## 2024-04-15 PROCEDURE — 3044F HG A1C LEVEL LT 7.0%: CPT | Performed by: NURSE PRACTITIONER

## 2024-04-15 PROCEDURE — 99213 OFFICE O/P EST LOW 20 MIN: CPT | Performed by: NURSE PRACTITIONER

## 2024-04-15 PROCEDURE — 3078F DIAST BP <80 MM HG: CPT | Performed by: NURSE PRACTITIONER

## 2024-04-15 PROCEDURE — 3074F SYST BP LT 130 MM HG: CPT | Performed by: NURSE PRACTITIONER

## 2024-04-15 PROCEDURE — 1036F TOBACCO NON-USER: CPT | Performed by: NURSE PRACTITIONER

## 2024-04-15 RX ORDER — OMEPRAZOLE 20 MG/1
20 TABLET, DELAYED RELEASE ORAL DAILY
Qty: 30 TABLET | Refills: 3 | Status: SHIPPED | OUTPATIENT
Start: 2024-04-15 | End: 2024-08-13

## 2024-04-15 ASSESSMENT — ENCOUNTER SYMPTOMS
EYES NEGATIVE: 1
ENDOCRINE NEGATIVE: 1
CARDIOVASCULAR NEGATIVE: 1
PSYCHIATRIC NEGATIVE: 1
STRIDOR: 0
FEVER: 0
ROS GI COMMENTS: SEE HPI
SHORTNESS OF BREATH: 0
FATIGUE: 0
MUSCULOSKELETAL NEGATIVE: 1
COUGH: 0
DIAPHORESIS: 0
ALLERGIC/IMMUNOLOGIC NEGATIVE: 1
CHILLS: 0
WHEEZING: 0
APNEA: 0
CHEST TIGHTNESS: 0
HEMATOLOGIC/LYMPHATIC NEGATIVE: 1
RESPIRATORY NEGATIVE: 1
NEUROLOGICAL NEGATIVE: 1
DIFFICULTY URINATING: 0

## 2024-04-15 ASSESSMENT — PAIN SCALES - GENERAL: PAINLEVEL: 0-NO PAIN

## 2024-04-15 NOTE — PATIENT INSTRUCTIONS
You will need to repeat the colonoscopy again in 3 years    Start omeprazole 20 mg daily    Continue fiber and use miralax as needed     See genetics    Follow up in 3 months          Patient Education:    Constipation refers to a change in bowel habits, but it has varied meanings. Stools may be too hard or too small, difficult to pass, or infrequent (less than three times per week). People with constipation may also notice a frequent need to strain and a sense that the bowels are not empty.    Your bowels like consistency and routine.     Behavior changes -- The bowels are most active following meals, and this is often the time when stools will pass most readily. If you ignore your body's signals to have a bowel movement, the signals become weaker and weaker over time.    By paying close attention to these signals, you may have an easier time moving your bowels. Drinking a caffeine-containing beverage in the morning may also be helpful.    Increase fiber -- Increasing fiber in your diet may reduce or eliminate constipation. The recommended amount of dietary fiber is 20 to 35 grams of fiber per day. Examples of high fiber foods include pears, spinach, apples, raspberries.     Fiber side effects -- Consuming large amounts of fiber can cause abdominal bloating or gas; this can be minimized by starting with a small amount and slowly increasing until stools become softer and more frequent.    More recently, kiwi fruit has been identified as helping with constipation. It is recommended to eat two daily.    Whatever you decide to use, please try daily for 2 weeks to notice improvement.     If you continue to have constipation despite trying these methods, please schedule a follow-up appointment.

## 2024-04-15 NOTE — PROGRESS NOTES
Subjective   Patient ID: Phi Viera is a 54 y.o. male who presents for Colonoscopy follow up. PMH: Type II DM, HTN, HLD  Presents for follow up after E/C  Continues to have globus sensation and coughing at time  Will start PPI    Constipation improved with fiber, can use Miralax as needed.     He denies vomiting, abdominal pain, has regurgitation (this is occurring daily) he denies dysphagia, odynophagia. He denies heartburn.     Family Hx: Mother had pancreatic cancer  M. Uncle with lung cancer  M. Great grandmother with a cancer of unknown     Social Hx: Smoked for 35 years, quit 4 months, he denies ETOH use, denies drug use       Colonoscopy in 2022 found seven 4-10 mm polyps (SSA and TA)  Colonoscopy (2024)  A.  DISTAL ESOPHAGUS, BIOPSY:  - SQUAMOUS EPITHELIUM, NO SIGNIFICANT HISTOPATHOLOGICAL ABNORMALITIES.     B.  PROXIMAL ESOPHAGUS, BIOPSY:  - SQUAMOUS EPITHELIUM, NO SIGNIFICANT HISTOPATHOLOGICAL ABNORMALITIES.     C.  HEPATIC FLEXURE OF COLON, POLYPECTOMY:  - TUBULAR ADENOMA.     D.  ASCENDING COLON, POLYPECTOMY:  - TUBULAR ADENOMA.     E.  TRANSVERSE COLON, POLYPECTOMY:  - TUBULAR ADENOMA.    Review of Systems   Constitutional:  Negative for chills, diaphoresis, fatigue and fever.   HENT: Negative.     Eyes: Negative.    Respiratory: Negative.  Negative for apnea, cough, chest tightness, shortness of breath, wheezing and stridor.    Cardiovascular: Negative.    Gastrointestinal:         See HPI    Endocrine: Negative.    Genitourinary: Negative.  Negative for difficulty urinating.   Musculoskeletal: Negative.    Skin: Negative.    Allergic/Immunologic: Negative.    Neurological: Negative.    Hematological: Negative.    Psychiatric/Behavioral: Negative.         Objective   Physical Exam  Constitutional:       Appearance: He is normal weight.   Neurological:      Mental Status: He is alert.   Psychiatric:         Mood and Affect: Mood normal.         Assessment/Plan   Diagnoses and all orders for this  visit:  Choking, initial encounter  History of colon polyps  Globus sensation  Regurgitation of food  Chronic constipation     54 year old male with a PMH of type II DM, sleep apnea, HLD who presents for follow up after colonoscopy.  Underwent a colonoscopy on 3/18/24 and found to have 4 polyps, all tubular adenomas. Prior colonoscopy in 2022 found 7 TA/SSA.  Of note, Mother had pancreatic cancer (heavy drinker) and m. Great grandmother also had a cancer of unknown origin. He will repeat colonoscopy again in 3 years. I also gave a referral to genetics given the number of adenomatous polyps (10+).   For GERD he will start Omeprazole and continue fiber and Miralax for constipation.       Abbey Mack, SENAIT-CNP 04/15/24 8:39 AM

## 2024-04-20 ENCOUNTER — OFFICE VISIT (OUTPATIENT)
Dept: PRIMARY CARE | Facility: CLINIC | Age: 54
End: 2024-04-20
Payer: COMMERCIAL

## 2024-04-20 VITALS
SYSTOLIC BLOOD PRESSURE: 110 MMHG | WEIGHT: 198 LBS | TEMPERATURE: 97.8 F | BODY MASS INDEX: 28.35 KG/M2 | OXYGEN SATURATION: 95 % | HEART RATE: 68 BPM | DIASTOLIC BLOOD PRESSURE: 70 MMHG | HEIGHT: 70 IN

## 2024-04-20 DIAGNOSIS — I10 BENIGN ESSENTIAL HYPERTENSION: Primary | ICD-10-CM

## 2024-04-20 DIAGNOSIS — E11.9 TYPE 2 DIABETES MELLITUS WITHOUT COMPLICATION, WITHOUT LONG-TERM CURRENT USE OF INSULIN (MULTI): ICD-10-CM

## 2024-04-20 DIAGNOSIS — Z87.891 FORMER SMOKER: ICD-10-CM

## 2024-04-20 DIAGNOSIS — E55.9 VITAMIN D DEFICIENCY: ICD-10-CM

## 2024-04-20 DIAGNOSIS — E78.2 MIXED HYPERLIPIDEMIA: ICD-10-CM

## 2024-04-20 PROCEDURE — 3048F LDL-C <100 MG/DL: CPT | Performed by: FAMILY MEDICINE

## 2024-04-20 PROCEDURE — 3074F SYST BP LT 130 MM HG: CPT | Performed by: FAMILY MEDICINE

## 2024-04-20 PROCEDURE — 3062F POS MACROALBUMINURIA REV: CPT | Performed by: FAMILY MEDICINE

## 2024-04-20 PROCEDURE — 3078F DIAST BP <80 MM HG: CPT | Performed by: FAMILY MEDICINE

## 2024-04-20 PROCEDURE — 3044F HG A1C LEVEL LT 7.0%: CPT | Performed by: FAMILY MEDICINE

## 2024-04-20 PROCEDURE — 99214 OFFICE O/P EST MOD 30 MIN: CPT | Performed by: FAMILY MEDICINE

## 2024-04-20 RX ORDER — DULAGLUTIDE 1.5 MG/.5ML
1.5 INJECTION, SOLUTION SUBCUTANEOUS
Qty: 12 EACH | Refills: 0 | Status: SHIPPED | OUTPATIENT
Start: 2024-04-21 | End: 2024-07-20

## 2024-04-20 ASSESSMENT — PATIENT HEALTH QUESTIONNAIRE - PHQ9
SUM OF ALL RESPONSES TO PHQ9 QUESTIONS 1 AND 2: 0
2. FEELING DOWN, DEPRESSED OR HOPELESS: NOT AT ALL
1. LITTLE INTEREST OR PLEASURE IN DOING THINGS: NOT AT ALL

## 2024-04-20 NOTE — PROGRESS NOTES
"Subjective   Patient ID: Phi Viera is a 54 y.o. male who presents for Follow-up.    HPI     4/20/2024:  Pt reports the following:  Has had tubes put in ears ands right ear keeps bleeding, goes back on Monday to   ENT.        Patient has HTN.  He does not monitor his BP at home.   He reports that cardiology started him on amlodipine for elevated BP.  Denies chest pain, dizziness, LE swelling.      He has type 2 diabetes.  He does monitor his sugars at home & states they are improving but have not returned to previous baseline.   Pt denies any polyuria, polydipsia, polyphagia.     He has hyperlipidemia.  Patient tries to limit the amount of fatty foods and high cholesterol foods that are consumed.  He reports eating a lot of fruit recently, which he thinks may explain elevated triglyceride level.  He has been compliant with his Vascepa and denies any noted side effects.  HE HAS BEEN INTOLERANT OF MULTIPLE STATINS DUE TO MYALGIA.     He has known vitamin D deficiency.  Pt has been compliant with Vit D supplementation.     He has depression.  Symptoms have been stable.  He continues to follow with his psychiatrist on a regular basis.           Review of Systems  Constitutional: Patient denies any fever, chills, loss of appetite, or unexplained weight loss.  Cardiovascular: Patient denies any chest pain, shortness of breath with exertion, tachycardia, palpitations, orthopnea, or paroxysmal nocturnal dyspnea.  Respiratory: Patient denies any cough, shortness breath, or wheezing.  Skin: Denies any rashes or skin lesions.   Neurology: Patient denies any new motor or sensory losses.  Denies any numbness, tingling, weakness, and incoordination of the extremities.  Patient also denies any tremor, seizures, or gait instability.  Endocrinology: Denies any polyuria, polydipsia, polyphagia, or heat/cold intolerance.      Objective   /67   Pulse 68   Temp 36.6 °C (97.8 °F)   Ht 1.778 m (5' 10\")   Wt 89.8 kg (198 lb)  "  SpO2 95%   BMI 28.41 kg/m²     Physical Exam  General Appearance: Alert and cooperative, in no acute distress, well-developed/well-nourished.  Neck: Supple and without adenopathy or rigidity.  There is no JVD at 90° and no carotid bruits are noted.  There is no thyromegaly, thyroid tenderness, or palpable thyroid nodules.  Heart: Regular rate and rhythm without murmur or ectopy.  Respiratory: Lungs are clear to auscultation bilaterally with good air exchange.  Good respiratory effort and no accessory muscle use.  Skin: Good turgor, moist, warm and without rashes or lesions.  Neurological exam: Alert and oriented ×3, no tremor, normal gait.  Extremities: No clubbing, cyanosis, or edema    ENT:  Bilat PE tubes in place and appear patent.    Assessment/Plan   1. Benign essential hypertension  Stable on in office readings.  Continue the current medication.    2. Mixed hyperlipidemia  Stable on last labs.  Diet changes recommended.  Continue the current medication.  - Comprehensive Metabolic Panel; Future    3. Type 2 diabetes mellitus without complication, without long-term current use of insulin (Multi)  Well controlled at this time.  Last A1c was:  Lab Results   Component Value Date    HGBA1C 5.5 04/01/2024   Continue the current medications.  - dulaglutide (Trulicity) 1.5 mg/0.5 mL pen injector injection; Inject 1.5 mg under the skin 1 (one) time per week.  Dispense: 12 each; Refill: 0  - Hemoglobin A1C; Future  - Comprehensive Metabolic Panel; Future    4. Vitamin D deficiency  4/1/24 labs revealed Vit D level of 32.  Pt to increase his Vit D supplementation to 8000 units 1 day per week and 4000 units 6 days per week.    5. Former smoker  Will check a low dose CT for lung cancer screening.  - CT lung screening low dose; Future        Orders Placed This Encounter   Procedures    CT lung screening low dose    Hemoglobin A1C    Comprehensive Metabolic Panel     Requested Prescriptions     Signed Prescriptions Disp  Refills    dulaglutide (Trulicity) 1.5 mg/0.5 mL pen injector injection 12 each 0     Sig: Inject 1.5 mg under the skin 1 (one) time per week.

## 2024-04-20 NOTE — PATIENT INSTRUCTIONS
We are expecting to start seeing patients at our new location on 5/1/2024.  Our new address will be:  [address] [place], [place] [id number]       Follow up in 3 months with labs to be done PRIOR.    It was a pleasure to see you today. Thank you for choosing us for your health care needs.    If you have lab or other testing completed and have not been informed of results within one week, please call the office for your results.    If you haven't done so, consider signing up for [facility] [employer], the [facility] personal health record. Ask the staff how you can get started.

## 2024-04-29 ENCOUNTER — HOSPITAL ENCOUNTER (OUTPATIENT)
Dept: RADIOLOGY | Facility: HOSPITAL | Age: 54
Discharge: HOME | End: 2024-04-29
Payer: COMMERCIAL

## 2024-04-29 DIAGNOSIS — Z87.891 FORMER SMOKER: ICD-10-CM

## 2024-04-29 PROCEDURE — 71271 CT THORAX LUNG CANCER SCR C-: CPT

## 2024-05-06 ENCOUNTER — OFFICE VISIT (OUTPATIENT)
Dept: DERMATOLOGY | Facility: CLINIC | Age: 54
End: 2024-05-06
Payer: COMMERCIAL

## 2024-05-06 DIAGNOSIS — D48.5 NEOPLASM OF UNCERTAIN BEHAVIOR OF SKIN: ICD-10-CM

## 2024-05-06 DIAGNOSIS — Z12.83 SKIN CANCER SCREENING: Primary | ICD-10-CM

## 2024-05-06 DIAGNOSIS — L81.4 LENTIGO: ICD-10-CM

## 2024-05-06 DIAGNOSIS — D22.9 NEVUS: ICD-10-CM

## 2024-05-06 DIAGNOSIS — D49.2 NEOPLASM OF SKIN: ICD-10-CM

## 2024-05-06 PROCEDURE — 3062F POS MACROALBUMINURIA REV: CPT | Performed by: NURSE PRACTITIONER

## 2024-05-06 PROCEDURE — 3044F HG A1C LEVEL LT 7.0%: CPT | Performed by: NURSE PRACTITIONER

## 2024-05-06 PROCEDURE — 88305 TISSUE EXAM BY PATHOLOGIST: CPT | Performed by: DERMATOLOGY

## 2024-05-06 PROCEDURE — 99203 OFFICE O/P NEW LOW 30 MIN: CPT | Performed by: NURSE PRACTITIONER

## 2024-05-06 PROCEDURE — 1036F TOBACCO NON-USER: CPT | Performed by: NURSE PRACTITIONER

## 2024-05-06 PROCEDURE — 3048F LDL-C <100 MG/DL: CPT | Performed by: NURSE PRACTITIONER

## 2024-05-06 NOTE — PROGRESS NOTES
Subjective     Phi Viera is a 54 y.o. male who presents for the following: Skin Check.   New patient in for full body skin exam.     Review of Systems:  No other skin or systemic complaints other than what is documented elsewhere in the note.    The following portions of the chart were reviewed this encounter and updated as appropriate:       Skin Cancer History  No skin cancer on file.    Specialty Problems          Dermatology Problems    Skin tag     Past Medical History:  Phi Viera  has a past medical history of Depression, Hyperlipidemia, Personal history of other diseases of urinary system (05/11/2016), and Personal history of other mental and behavioral disorders.    Past Surgical History:  Phi Viera  has a past surgical history that includes Vasectomy (12/16/2015); Other surgical history (12/16/2015); and Other surgical history (06/06/2022).    Family History:  Patient family history is not on file.    Social History:  Phi Viera  reports that he quit smoking about 5 months ago. His smoking use included cigarettes. He started smoking about 20 years ago. He has a 30 pack-year smoking history. He has never used smokeless tobacco. He reports that he does not currently use alcohol. He reports that he does not use drugs.    Allergies:  Iodides, Atorvastatin, Fluvastatin, Iodinated contrast media, Lovastatin, Pravastatin, Rosuvastatin, Simvastatin, and Statins-hmg-coa reductase inhibitors    Current Medications / CAM's:    Current Outpatient Medications:     amLODIPine (Norvasc) 5 mg tablet, Take 1 tablet (5 mg) by mouth once daily., Disp: 90 tablet, Rfl: 3    aspirin 81 mg EC tablet, Take 1 tablet (81 mg) by mouth once daily., Disp: , Rfl:     bempedoic acid-ezetimibe (Nexlizet) 180-10 mg tablet, Take 1 tablet by mouth once daily., Disp: , Rfl:     blood sugar diagnostic (OneTouch Verio test strips) strip, 1 strip once daily., Disp: 100 strip, Rfl: 3    cholecalciferol (Vitamin D-3) 50 mcg (2,000  unit) capsule, Take 2 capsules (100 mcg) by mouth early in the morning.., Disp: , Rfl:     cyclobenzaprine (Flexeril) 10 mg tablet, Take 1 tablet (10 mg) by mouth 3 times a day as needed for muscle spasms., Disp: , Rfl:     docusate sodium (Colace) 100 mg capsule, Take 1 capsule (100 mg) by mouth once daily., Disp: 60 capsule, Rfl: 0    dulaglutide (Trulicity) 1.5 mg/0.5 mL pen injector injection, Inject 1.5 mg under the skin 1 (one) time per week., Disp: 12 each, Rfl: 0    FLUoxetine (PROzac) 20 mg tablet, Take 1 tablet (20 mg) by mouth once daily., Disp: , Rfl:     fluticasone (Flonase) 50 mcg/actuation nasal spray, Administer 2 sprays into affected nostril(s) once daily., Disp: , Rfl:     lamoTRIgine (LaMICtal) 25 mg tablet, Take 2 tablets (50 mg) by mouth once daily., Disp: , Rfl:     metFORMIN XR (Glucophage-XR) 500 mg 24 hr tablet, Take 2 tablets (1,000 mg) by mouth once daily in the evening. Take with meals., Disp: 180 tablet, Rfl: 0    omeprazole OTC (PriLOSEC OTC) 20 mg EC tablet, Take 1 tablet (20 mg) by mouth once daily. Do not crush, chew, or split., Disp: 30 tablet, Rfl: 3    risperiDONE (RisperDAL) 1 mg tablet, Take 1 tablet (1 mg) by mouth 2 times a day., Disp: , Rfl:     Vascepa 1 gram capsule, Take 2 capsules (2 g) by mouth 2 times a day., Disp: 360 capsule, Rfl: 0    wheat dextrin (Benefiber Sugar Free, dextrin,) 3 gram/4 gram powder, Take 1 teaspoon daily with a full glass of water (8 ounces), Disp: 248 g, Rfl: 1     Objective   Well appearing patient in no apparent distress; mood and affect are within normal limits.    A full examination was performed including scalp, head, eyes, ears, nose, lips, neck, chest, axillae, abdomen, back, buttocks, bilateral upper extremities, bilateral lower extremities, hands, feet, fingers, toes, fingernails, and toenails. All findings within normal limits unless otherwise noted below.    Assessment/Plan   1. Skin cancer screening    The patient presented for a  routine skin examination today. There are no specific concerns regarding skin health and no new or changing moles, lesions, or rashes.     Assessment: Based on the comprehensive skin examination, there were no concerning or abnormal findings. The patient's skin appeared to be in good health, without any notable dermatologic conditions or lesions.    Plan: Given the absence of any significant skin findings, no specific interventions or treatments are warranted at this time. The patient was educated on the importance of regular skin self-examinations and advised to promptly report any changes or concerns. Routine follow-up for a skin examination was recommended.    -These lesions have benign, reassuring patterns on dermoscopy.  -There were no concerning features found on exam today.  -Recommend continued self-observation, and to contact the office if any changes in nevi are  noticed.    Discussed/information given on safe sun practices and use of sunscreen, sun protective clothing or sun avoidance. Recommend to use OTC medication of sunscreen SPF 30 or higher on a daily basis prior to sun exposure to reduce the risk of skin cancer.    Contact Office if: Any lesions change in size, shape or color; itch, bum or bleed.         2. Nevus  Multiple benign appearing flesh colored to pigmented macules and papules     Plan: Counseling.  I counseled the patient regarding the following:  Instructions: Monthly self-skin checks to monitor for any changes in moles are recommended. Expectations: Benign Nevi are pigmented nests of cells within the skin.No treatment is necessary. Contact Office if: Any moles change in size, shape or color; itch, bum or bleed.    3. Lentigo  Scattered tan macules in sun-exposed areas.    Solar lentigo (a type of lentigo also known as a senile lentigo, age spot, or liver spot) is a benign pigmented macule appearing on fair-skinned individuals that is related to ultraviolet radiation (UVR) exposure,  typically from the sun.     PLAN:  Limiting sun exposure through avoidance, protective clothing, and use of sunscreens can help prevent the appearance of solar lentigines.    If lesion changes or becomes symptomatic she should return to clinic    4. Neoplasm of uncertain behavior of skin  Left Upper Back                  Lesion biopsy  Type of biopsy: tangential    Informed consent: discussed and consent obtained    Timeout: patient name, date of birth, surgical site, and procedure verified    Procedure prep:  Patient was prepped and draped  Anesthesia: the lesion was anesthetized in a standard fashion    Anesthetic:  1% lidocaine w/ epinephrine 1-100,000 local infiltration  Instrument used: DermaBlade    Hemostasis achieved with: aluminum chloride    Outcome: patient tolerated procedure well    Post-procedure details: sterile dressing applied and wound care instructions given    Dressing type: petrolatum and bandage      Staff Communication: Dermatology Local Anesthesia: 1 % Lidocaine / Epinephrine - Amount: 1ml    Specimen 1 - Dermatopathology- DERM LAB  Differential Diagnosis: DPN (surrounded by an SK) vs other  Check Margins Yes/No?:    Comments:    Dermpath Lab: Routine Histopathology (formalin-fixed tissue)    5. Neoplasm of skin  Right Lower Vermilion Lip  7mm pigmented macule with atypical border previously biopsied benign    PLAN:  Patient's wife states there has been evolution and he is scheduled to have a biopsy performed with an oral surgeon next month

## 2024-05-08 LAB
LABORATORY COMMENT REPORT: NORMAL
PATH REPORT.FINAL DX SPEC: NORMAL
PATH REPORT.GROSS SPEC: NORMAL
PATH REPORT.MICROSCOPIC SPEC OTHER STN: NORMAL
PATH REPORT.RELEVANT HX SPEC: NORMAL
PATH REPORT.TOTAL CANCER: NORMAL

## 2024-06-24 PROBLEM — F17.210 CIGARETTE SMOKER: Status: RESOLVED | Noted: 2023-06-06 | Resolved: 2024-06-24

## 2024-06-24 PROBLEM — K21.9 GASTROESOPHAGEAL REFLUX DISEASE: Status: ACTIVE | Noted: 2024-04-15

## 2024-06-24 PROBLEM — F17.200 NICOTINE DEPENDENCE: Status: RESOLVED | Noted: 2022-10-21 | Resolved: 2024-06-24

## 2024-06-25 ENCOUNTER — APPOINTMENT (OUTPATIENT)
Dept: CARDIOLOGY | Facility: CLINIC | Age: 54
End: 2024-06-25
Payer: COMMERCIAL

## 2024-06-25 ENCOUNTER — OFFICE VISIT (OUTPATIENT)
Dept: CARDIOLOGY | Facility: CLINIC | Age: 54
End: 2024-06-25
Payer: COMMERCIAL

## 2024-06-25 VITALS
HEIGHT: 70 IN | HEART RATE: 66 BPM | OXYGEN SATURATION: 96 % | BODY MASS INDEX: 30.21 KG/M2 | DIASTOLIC BLOOD PRESSURE: 68 MMHG | WEIGHT: 211 LBS | SYSTOLIC BLOOD PRESSURE: 118 MMHG

## 2024-06-25 VITALS
HEART RATE: 85 BPM | SYSTOLIC BLOOD PRESSURE: 114 MMHG | WEIGHT: 211 LBS | DIASTOLIC BLOOD PRESSURE: 73 MMHG | OXYGEN SATURATION: 96 % | BODY MASS INDEX: 30.21 KG/M2 | HEIGHT: 70 IN

## 2024-06-25 DIAGNOSIS — I25.84 CORONARY ARTERY CALCIFICATION: ICD-10-CM

## 2024-06-25 DIAGNOSIS — I25.10 CORONARY ARTERY CALCIFICATION: ICD-10-CM

## 2024-06-25 DIAGNOSIS — I10 BENIGN ESSENTIAL HYPERTENSION: Primary | ICD-10-CM

## 2024-06-25 DIAGNOSIS — I10 BENIGN ESSENTIAL HYPERTENSION: ICD-10-CM

## 2024-06-25 DIAGNOSIS — E78.5 DYSLIPIDEMIA: ICD-10-CM

## 2024-06-25 DIAGNOSIS — E78.5 DYSLIPIDEMIA: Primary | ICD-10-CM

## 2024-06-25 DIAGNOSIS — Z82.49 FAMILY HISTORY OF ISCHEMIC HEART DISEASE: ICD-10-CM

## 2024-06-25 PROCEDURE — 99214 OFFICE O/P EST MOD 30 MIN: CPT | Performed by: INTERNAL MEDICINE

## 2024-06-25 PROCEDURE — 3074F SYST BP LT 130 MM HG: CPT | Performed by: INTERNAL MEDICINE

## 2024-06-25 PROCEDURE — 3044F HG A1C LEVEL LT 7.0%: CPT | Performed by: INTERNAL MEDICINE

## 2024-06-25 PROCEDURE — 3062F POS MACROALBUMINURIA REV: CPT | Performed by: INTERNAL MEDICINE

## 2024-06-25 PROCEDURE — 93000 ELECTROCARDIOGRAM COMPLETE: CPT | Performed by: INTERNAL MEDICINE

## 2024-06-25 PROCEDURE — 3078F DIAST BP <80 MM HG: CPT | Performed by: INTERNAL MEDICINE

## 2024-06-25 PROCEDURE — 1036F TOBACCO NON-USER: CPT | Performed by: INTERNAL MEDICINE

## 2024-06-25 PROCEDURE — 3048F LDL-C <100 MG/DL: CPT | Performed by: INTERNAL MEDICINE

## 2024-06-25 RX ORDER — FLUOXETINE HYDROCHLORIDE 20 MG/1
20 CAPSULE ORAL EVERY MORNING
COMMUNITY
Start: 2024-06-01

## 2024-06-25 RX ORDER — BEMPEDOIC ACID AND EZETIMIBE 180; 10 MG/1; MG/1
1 TABLET, FILM COATED ORAL DAILY
Qty: 90 TABLET | Refills: 3 | Status: SHIPPED | OUTPATIENT
Start: 2024-06-25

## 2024-06-25 ASSESSMENT — ENCOUNTER SYMPTOMS
DEPRESSION: 0
LOSS OF SENSATION IN FEET: 0
OCCASIONAL FEELINGS OF UNSTEADINESS: 0

## 2024-06-25 ASSESSMENT — COLUMBIA-SUICIDE SEVERITY RATING SCALE - C-SSRS
2. HAVE YOU ACTUALLY HAD ANY THOUGHTS OF KILLING YOURSELF?: NO
6. HAVE YOU EVER DONE ANYTHING, STARTED TO DO ANYTHING, OR PREPARED TO DO ANYTHING TO END YOUR LIFE?: NO
1. IN THE PAST MONTH, HAVE YOU WISHED YOU WERE DEAD OR WISHED YOU COULD GO TO SLEEP AND NOT WAKE UP?: NO

## 2024-06-25 ASSESSMENT — PAIN SCALES - GENERAL
PAINLEVEL: 0-NO PAIN
PAINLEVEL: 0-NO PAIN

## 2024-06-25 ASSESSMENT — PATIENT HEALTH QUESTIONNAIRE - PHQ9
2. FEELING DOWN, DEPRESSED OR HOPELESS: NOT AT ALL
SUM OF ALL RESPONSES TO PHQ9 QUESTIONS 1 AND 2: 0
1. LITTLE INTEREST OR PLEASURE IN DOING THINGS: NOT AT ALL

## 2024-06-25 NOTE — PROGRESS NOTES
CHIEF COMPLAINT: routine follow-up visit    HISTORY OF PRESENT ILLNESS:    Great blood sugars, lost 34 lbs and stopped smoking, plant based diet, walking a lot ups (10 miles per day)    Ecg nsr    PCP: Stan Jensen   Cardiologist: Dr. Theo Simeon     Mr. Viera is a 55 yo M with hx as listed below who returns to Cardiology Clinic for follow-up visit; last seen by me in 6/2023. Doing very well and denies CP, SOB, dizziness, LH, LE edema, or palpitations. Had lost about 40 lbs then quit smoking and gained 6 back. Working on plant-based diet.  Works as  and walks 6-10 miles per day on average. Strong family hx of high cholesterol and CAD.      PAST MEDICAL/SURGICAL HISTORY:  -DLD with elevated Lp(a)  -DM  -former tobacco abuse  -HTN  -depression/anxiety  -EMI on CPAP  -kidney stone  -ADD     PRIOR CARDIAC TESTING:  -CAC (2024): 495  -Treadmill ECG stress test (2022): negative  -TTE (2018): EF 55-60%  -Treadmill stress test (2018): negative  -Treadmill nuclear stress test (2014): negative  -TTE (2014): EF 60-65%, RVSP 36    Allergies   Allergen Reactions    Iodides Hives     Rash, Hives, Mentation change    Atorvastatin Other    Fluvastatin Other    Iodinated Contrast Media Hives    Lovastatin Other    Pravastatin Other    Rosuvastatin Other    Simvastatin Other    Statins-Hmg-Coa Reductase Inhibitors Other     myalgias     Current Outpatient Medications:     amLODIPine (Norvasc) 5 mg tablet, Take 1 tablet (5 mg) by mouth once daily., Disp: 90 tablet, Rfl: 3    aspirin 81 mg EC tablet, Take 1 tablet (81 mg) by mouth once daily., Disp: , Rfl:     bempedoic acid-ezetimibe (Nexlizet) 180-10 mg tablet, Take 1 tablet by mouth once daily., Disp: , Rfl:     blood sugar diagnostic (OneTouch Verio test strips) strip, 1 strip once daily., Disp: 100 strip, Rfl: 3    cholecalciferol (Vitamin D-3) 50 mcg (2,000 unit) capsule, Take 2 capsules (100 mcg) by mouth early in the morning.., Disp: , Rfl:     cyclobenzaprine  "(Flexeril) 10 mg tablet, Take 1 tablet (10 mg) by mouth 3 times a day as needed for muscle spasms., Disp: , Rfl:     docusate sodium (Colace) 100 mg capsule, Take 1 capsule (100 mg) by mouth once daily., Disp: 60 capsule, Rfl: 0    dulaglutide (Trulicity) 1.5 mg/0.5 mL pen injector injection, Inject 1.5 mg under the skin 1 (one) time per week., Disp: 12 each, Rfl: 0    FLUoxetine (PROzac) 20 mg capsule, Take 1 capsule (20 mg) by mouth once daily in the morning., Disp: , Rfl:     FLUoxetine (PROzac) 20 mg tablet, Take 1 tablet (20 mg) by mouth once daily., Disp: , Rfl:     fluticasone (Flonase) 50 mcg/actuation nasal spray, Administer 2 sprays into affected nostril(s) once daily., Disp: , Rfl:     lamoTRIgine (LaMICtal) 25 mg tablet, Take 2 tablets (50 mg) by mouth once daily., Disp: , Rfl:     omeprazole OTC (PriLOSEC OTC) 20 mg EC tablet, Take 1 tablet (20 mg) by mouth once daily. Do not crush, chew, or split., Disp: 30 tablet, Rfl: 3    risperiDONE (RisperDAL) 1 mg tablet, Take 1 tablet (1 mg) by mouth 2 times a day., Disp: , Rfl:     Vascepa 1 gram capsule, Take 2 capsules (2 g) by mouth 2 times a day., Disp: 360 capsule, Rfl: 0    wheat dextrin (Benefiber Sugar Free, dextrin,) 3 gram/4 gram powder, Take 1 teaspoon daily with a full glass of water (8 ounces), Disp: 248 g, Rfl: 1    /73 (BP Location: Left arm, Patient Position: Sitting)   Pulse 85   Ht 1.778 m (5' 10\")   Wt 95.7 kg (211 lb)   SpO2 96%   BMI 30.28 kg/m²     PHYSICAL EXAM:  GENERAL: NAD  HEENT: no JVD  CV: RRR without m/r/g  PULM: CTAB  EXT: non-edematous bilateral lower extremities     LABS  WBC (x10E9/L)   Date Value   04/15/2022 6.6     Hemoglobin (g/dL)   Date Value   04/15/2022 15.3     Platelets (x10E9/L)   Date Value   04/15/2022 279     Sodium (mmol/L)   Date Value   04/01/2024 138     Potassium (mmol/L)   Date Value   04/01/2024 4.0     Chloride (mmol/L)   Date Value   04/01/2024 103     Bicarbonate (mmol/L)   Date Value "   04/01/2024 27     Urea Nitrogen (mg/dL)   Date Value   04/01/2024 10     Creatinine (mg/dL)   Date Value   04/01/2024 1.13     Calcium (mg/dL)   Date Value   04/01/2024 9.1     Total Protein (g/dL)   Date Value   10/16/2023 7.0     Bilirubin, Total (mg/dL)   Date Value   10/16/2023 0.3     Alkaline Phosphatase (U/L)   Date Value   10/16/2023 56     ALT (U/L)   Date Value   10/16/2023 15     AST (U/L)   Date Value   10/16/2023 17     Glucose (mg/dL)   Date Value   04/01/2024 119 (H)     Cholesterol (mg/dL)   Date Value   04/01/2024 124   10/16/2023 120   04/03/2023 128   12/28/2022 137   10/06/2022 134     LDL Calculated (mg/dL)   Date Value   04/01/2024 44   10/16/2023 41     HDL-Cholesterol (mg/dL)   Date Value   04/01/2024 36.9   10/16/2023 32.1     HDL (mg/dL)   Date Value   04/03/2023 27.9 (A)   12/28/2022 32.4 (A)   10/06/2022 36.0 (A)     Triglycerides (mg/dL)   Date Value   04/01/2024 215 (H)   10/16/2023 234 (H)   04/03/2023 205 (H)   12/28/2022 351 (H)   10/06/2022 174 (H)     Hemoglobin A1C (%)   Date Value   04/01/2024 5.5   01/15/2024 6.1 (H)   10/16/2023 6.0 (H)     Lab Results   Component Value Date    LDLF 59 04/03/2023     ASSESSMENT/PLAN: 55 yo M with hx of HTN, DM, EMI on CPAP, and DLD with elevated Lp(a) here for follow-up visit.  and LDL 44. Continue vascepa 2g BID and nexlizet 180/10 mg daily. If TG remain high, could consider trial of fibrate though with statin intolerance, higher risk of myalgia. On ASA 81 mg daily for primary prevention; defer to PCP/Dr. Simeon. BP controlled on amlodipine 5 mg daily. Great blood sugar control with HgbA1c 5.5% on trulicity 1.5 mg weekly. Applauded weight loss and quitting smoking! RTC 1 year.

## 2024-06-25 NOTE — PROGRESS NOTES
Chief Complaint:   No chief complaint on file.     History Of Present Illness:    Phi Viera is a 54 y.o. male with a history of dyslipidemia, diabetes, obstructive sleep apnea on CPAP, anxiety, tobacco use, and a family history of coronary artery disease who is being evaluated for routine follow-up.     Continues to be doing well.  Denies any exertional chest pain or shortness of breath.  Continues to work for [employer].  He estimates that he walks about 5 to 7 miles a day.    Low-dose screening CT 4/29/2024: From a cardiovascular perspective patient noted to have coronary artery calcification with CT calcium score of 495.     Treadmill stress test 8/12/2022: Exercised 9 minutes on a standard Rodo protocol achieving 11 METS. No evidence of ischemia at maximal workload.     Treadmill stress test 4/13/18 demonstrating good heart rate and blood pressure response exercise. Exercise for 10 minutes on a standard Rodo protocol achieving 11.7 metabolic equivalents. No significant ST or T changes noted during exercise. Overall low likelihood of flow-limiting coronary artery disease.     Treadmill nuclear stress test 10/15/14 demonstrating no evidence of exercise-induced ischemia by ECG nor by nuclear imaging. EF 65%.     Echocardiogram 10/15/14 demonstrating normal LV size and function, EF 60-65%. RV normal size and function with borderline elevated RVSP of 36 mmHg. Trivial TR.     Sleep study 3/21/14 demonstrating no evidence of sleep apnea. The patient was told that he did not need the CPAP however he sleeps much more soundly while he is wearing the CPAP.      Past Medical History:  He has a past medical history of Depression, Hyperlipidemia, Personal history of other diseases of urinary system (05/11/2016), and Personal history of other mental and behavioral disorders.    Past Surgical History:  He has a past surgical history that includes Vasectomy (12/16/2015); Other surgical history (12/16/2015); and Other surgical  "history (06/06/2022).      Social History:  He reports that he quit smoking about 6 months ago. His smoking use included cigarettes. He started smoking about 20 years ago. He has a 30 pack-year smoking history. He has never used smokeless tobacco. He reports that he does not currently use alcohol. He reports that he does not use drugs.    Family History:  No family history on file.     Allergies:  Iodides, Atorvastatin, Fluvastatin, Iodinated contrast media, Lovastatin, Pravastatin, Rosuvastatin, Simvastatin, and Statins-hmg-coa reductase inhibitors    Outpatient Medications:  Current Outpatient Medications   Medication Instructions    amLODIPine (NORVASC) 5 mg, oral, Daily    aspirin 81 mg, oral, Daily    bempedoic acid-ezetimibe (Nexlizet) 180-10 mg tablet 1 tablet, oral, Daily    blood sugar diagnostic (OneTouch Verio test strips) strip 1 strip, miscellaneous, Daily    cholecalciferol (VITAMIN D-3) 100 mcg, oral, Daily    cyclobenzaprine (FLEXERIL) 10 mg, oral, 3 times daily PRN    docusate sodium (COLACE) 100 mg, oral, Daily    FLUoxetine (PROzac) 20 mg tablet 1 tablet, oral, Daily    fluticasone (Flonase) 50 mcg/actuation nasal spray 2 sprays, nasal, Daily    lamoTRIgine (LaMICtal) 25 mg tablet 2 tablets, oral, Daily    omeprazole OTC (PRILOSEC OTC) 20 mg, oral, Daily, Do not crush, chew, or split.    risperiDONE (RISPERDAL) 1 mg, oral, 2 times daily    Trulicity 1.5 mg, subcutaneous, Once Weekly    Vascepa 2 g, oral, 2 times daily    wheat dextrin (Benefiber Sugar Free, dextrin,) 3 gram/4 gram powder Take 1 teaspoon daily with a full glass of water (8 ounces)       Last Recorded Vitals:  Visit Vitals  /68 (BP Location: Left arm, Patient Position: Sitting, BP Cuff Size: Adult)   Pulse 66   Ht 1.778 m (5' 10\")   Wt 95.7 kg (211 lb)   SpO2 96%   BMI 30.28 kg/m²   Smoking Status Former   BSA 2.17 m²      LASTWT(3):   Wt Readings from Last 3 Encounters:   06/25/24 95.7 kg (211 lb)   04/20/24 89.8 kg (198 lb) "   04/15/24 95.7 kg (211 lb)       Physical Exam:  In general: alert and in no acute distress.   HEENT: Carotid upstrokes normal with no bruits. JVP is normal.   Pulmonary: Clear to auscultation bilaterally.  Cardiovascular: S1,S2, regular. No appreciable murmurs, rubs or gallops.   Lower extremities: Warm.  1+ distal pulses. No edema.     Last Labs:  CBC -  Recent Labs     04/15/22  1245   WBC 6.6   HGB 15.3   HCT 41.6      MCV 92       CMP -  Recent Labs     04/01/24  0639 01/15/24  0700 10/16/23  0649    139 139   K 4.0 4.5 4.2    105 105   CO2 27 26 28   ANIONGAP 12 13 10   BUN 10 11 15   CREATININE 1.13 1.07 1.22   EGFR 78 84 72     Recent Labs     10/16/23  0649 04/03/23  0939 10/06/22  0640   ALBUMIN 4.4 4.7 4.6   ALKPHOS 56 44 53   ALT 15 31 65*   AST 17 23 47*   BILITOT 0.3 0.6 0.7       LIPID PANEL -   Recent Labs     04/01/24  0639 10/16/23  0649 04/03/23  0939 12/28/22  0708 10/06/22  0640   CHOL 124 120 128 137 134   LDLCALC 44 41  --   --   --    LDLF  --   --  59 34 63   HDL 36.9 32.1 27.9* 32.4* 36.0*   TRIG 215* 234* 205* 351* 174*       Recent Labs     04/01/24  0639 01/15/24  0700 10/16/23  0649   HGBA1C 5.5 6.1* 6.0*           Assessment/Plan   1) dyslipidemia: Lipid panel well-controlled.  Triglycerides still elevated however LDL controlled.  Continue Vascepa. Intolerant to several statins.  Will be seeing Dr. Ibanez soon.     2) coronary artery calcification: I explained to the patient that when cholesterol builds up in the artery wall that it irritates the arteries and recruits white blood cells to come and try to get rid of the cholesterol.  Unfortunately when the white blood cell eats the cholesterol molecule it almost becomes a bone forming cell and produces calcium which stays within the artery.  Calcium is a metal and looks different under CT scan and soft tissue.  When the CT is performed and demonstrates calcification of the coronary arteries we know that there is  some degree of coronary artery disease.    He had a stress test in August 2022 which demonstrated no obstructive coronary disease.  For now I explained that we would continue to observe.  If he were to develop exertional chest pain or shortness of breath he should let us know.    I also explained that there is no way to remove the calcification from the coronary arteries.  Coronary artery calcification only increases over time.  There is no role for repeat CT scan to reassess the coronary calcification.    I explained that we we will simply continue with risk factor modification including controlling his blood pressure and his cholesterol as best we can.    If he develops exertional chest pain or shortness of breath in the meantime he should call us and we could always consider stress testing or catheterization based on the severity of his symptoms.  As long as these do not develop we will continue observation.     3) hypertension: Blood pressure controlled.  Amlodipine is not due for renewal until January.    4) follow-up: Asked him to see our nurse practitioner in January and then we can reestablish annual follow-ups at that time to be offset with Dr. Ibanez who sees him in the summer months.        Theo Simeon MD

## 2024-07-08 DIAGNOSIS — K21.9 GASTROESOPHAGEAL REFLUX DISEASE, UNSPECIFIED WHETHER ESOPHAGITIS PRESENT: ICD-10-CM

## 2024-07-08 RX ORDER — OMEPRAZOLE 20 MG/1
CAPSULE, DELAYED RELEASE ORAL DAILY
Qty: 90 CAPSULE | Refills: 1 | Status: SHIPPED | OUTPATIENT
Start: 2024-07-08

## 2024-07-22 ENCOUNTER — LAB (OUTPATIENT)
Dept: LAB | Facility: LAB | Age: 54
End: 2024-07-22
Payer: COMMERCIAL

## 2024-07-22 ENCOUNTER — APPOINTMENT (OUTPATIENT)
Dept: PRIMARY CARE | Facility: CLINIC | Age: 54
End: 2024-07-22
Payer: COMMERCIAL

## 2024-07-22 DIAGNOSIS — E11.9 TYPE 2 DIABETES MELLITUS WITHOUT COMPLICATION, WITHOUT LONG-TERM CURRENT USE OF INSULIN (MULTI): ICD-10-CM

## 2024-07-22 DIAGNOSIS — E78.2 MIXED HYPERLIPIDEMIA: ICD-10-CM

## 2024-07-22 LAB
ALBUMIN SERPL BCP-MCNC: 4.5 G/DL (ref 3.4–5)
ALP SERPL-CCNC: 51 U/L (ref 33–120)
ALT SERPL W P-5'-P-CCNC: 23 U/L (ref 10–52)
ANION GAP SERPL CALC-SCNC: 12 MMOL/L (ref 10–20)
AST SERPL W P-5'-P-CCNC: 23 U/L (ref 9–39)
BILIRUB SERPL-MCNC: 0.4 MG/DL (ref 0–1.2)
BUN SERPL-MCNC: 7 MG/DL (ref 6–23)
CALCIUM SERPL-MCNC: 9.5 MG/DL (ref 8.6–10.3)
CHLORIDE SERPL-SCNC: 106 MMOL/L (ref 98–107)
CO2 SERPL-SCNC: 27 MMOL/L (ref 21–32)
CREAT SERPL-MCNC: 1.1 MG/DL (ref 0.5–1.3)
EGFRCR SERPLBLD CKD-EPI 2021: 81 ML/MIN/1.73M*2
EST. AVERAGE GLUCOSE BLD GHB EST-MCNC: 128 MG/DL
GLUCOSE SERPL-MCNC: 107 MG/DL (ref 74–99)
HBA1C MFR BLD: 6.1 %
POTASSIUM SERPL-SCNC: 4.2 MMOL/L (ref 3.5–5.3)
PROT SERPL-MCNC: 6.9 G/DL (ref 6.4–8.2)
SODIUM SERPL-SCNC: 141 MMOL/L (ref 136–145)

## 2024-07-22 PROCEDURE — 83036 HEMOGLOBIN GLYCOSYLATED A1C: CPT

## 2024-07-22 PROCEDURE — 36415 COLL VENOUS BLD VENIPUNCTURE: CPT

## 2024-07-22 PROCEDURE — 80053 COMPREHEN METABOLIC PANEL: CPT

## 2024-07-25 PROBLEM — E78.5 HYPERLIPIDEMIA: Status: ACTIVE | Noted: 2024-07-25

## 2024-07-29 ENCOUNTER — APPOINTMENT (OUTPATIENT)
Dept: PRIMARY CARE | Facility: CLINIC | Age: 54
End: 2024-07-29
Payer: COMMERCIAL

## 2024-07-29 VITALS
HEART RATE: 76 BPM | BODY MASS INDEX: 30.28 KG/M2 | TEMPERATURE: 98.4 F | DIASTOLIC BLOOD PRESSURE: 71 MMHG | HEIGHT: 70 IN | SYSTOLIC BLOOD PRESSURE: 113 MMHG | OXYGEN SATURATION: 96 %

## 2024-07-29 DIAGNOSIS — E55.9 VITAMIN D DEFICIENCY: ICD-10-CM

## 2024-07-29 DIAGNOSIS — E78.2 MIXED HYPERLIPIDEMIA: ICD-10-CM

## 2024-07-29 DIAGNOSIS — I10 BENIGN ESSENTIAL HYPERTENSION: Primary | ICD-10-CM

## 2024-07-29 DIAGNOSIS — Z12.5 PROSTATE CANCER SCREENING: ICD-10-CM

## 2024-07-29 DIAGNOSIS — E11.9 TYPE 2 DIABETES MELLITUS WITHOUT COMPLICATION, WITHOUT LONG-TERM CURRENT USE OF INSULIN (MULTI): ICD-10-CM

## 2024-07-29 PROCEDURE — 99214 OFFICE O/P EST MOD 30 MIN: CPT | Performed by: FAMILY MEDICINE

## 2024-07-29 PROCEDURE — 3074F SYST BP LT 130 MM HG: CPT | Performed by: FAMILY MEDICINE

## 2024-07-29 PROCEDURE — 3078F DIAST BP <80 MM HG: CPT | Performed by: FAMILY MEDICINE

## 2024-07-29 PROCEDURE — 3044F HG A1C LEVEL LT 7.0%: CPT | Performed by: FAMILY MEDICINE

## 2024-07-29 PROCEDURE — 3048F LDL-C <100 MG/DL: CPT | Performed by: FAMILY MEDICINE

## 2024-07-29 PROCEDURE — 3062F POS MACROALBUMINURIA REV: CPT | Performed by: FAMILY MEDICINE

## 2024-07-29 RX ORDER — DULAGLUTIDE 1.5 MG/.5ML
1.5 INJECTION, SOLUTION SUBCUTANEOUS
Qty: 12 EACH | Refills: 0 | Status: SHIPPED | OUTPATIENT
Start: 2024-08-04 | End: 2024-11-02

## 2024-07-29 NOTE — PATIENT INSTRUCTIONS
Follow up in 3 months with labs to be done PRIOR.    It was a pleasure to see you today. Thank you for choosing us for your health care needs.    If you have lab or other testing completed and have not been informed of results within one week, please call the office for your results.    If you haven't done so, consider signing up for [facility] [employer], the [facility] personal health record. Ask the staff how you can get started.

## 2024-07-29 NOTE — PROGRESS NOTES
Subjective   Patient ID: Phi Viera is a 54 y.o. male who presents for Follow-up.    HPI     No new concerns at this time.    7/29/2024:  He states that he started smoking cigars, not knowing they had nicotine and then started to smoke cigarettes again.  Smoking 1/4 pack per day.  He is working on stopping again.    He states he gained 7# after getting his teeth fixed and will go back on a diet.     Labs: 7/22/24  Colonoscopy: 3/2024    Patient has HTN.  He does not monitor his BP at home.   He reports that cardiology started him on amlodipine for elevated BP.  Denies chest pain, dizziness, LE swelling.      He has type 2 diabetes.  He does monitor his sugars at home & states they are improving but have not returned to previous baseline.   Pt denies any polyuria, polydipsia, polyphagia.     He has hyperlipidemia.  Patient tries to limit the amount of fatty foods and high cholesterol foods that are consumed.  He reports eating a lot of fruit recently, which he thinks may explain elevated triglyceride level.  He has been compliant with his Vascepa and denies any noted side effects.  HE HAS BEEN INTOLERANT OF MULTIPLE STATINS DUE TO MYALGIA.     He has known vitamin D deficiency.  Pt has been compliant with Vit D supplementation.     He has depression.  Symptoms have been stable.  He continues to follow with his psychiatrist on a regular basis.    Review of Systems  Constitutional: Patient denies any fever, chills, loss of appetite, or unexplained weight loss.  Cardiovascular: Patient denies any chest pain, shortness of breath with exertion, tachycardia, palpitations, orthopnea, or paroxysmal nocturnal dyspnea.  Respiratory: Patient denies any cough, shortness breath, or wheezing.  Gastrointestinal: Patient denies any nausea, vomiting, diarrhea, constipation, melena, hematochezia, or reflux symptoms.  Skin: Denies any rashes or skin lesions.   Neurology: Patient denies any new motor or sensory losses.  Denies any  "numbness, tingling, weakness, and incoordination of the extremities.  Patient also denies any tremor, seizures, or gait instability.  Endocrinology: Denies any polyuria, polydipsia, polyphagia, or heat/cold intolerance.    Objective   /71   Pulse 76   Temp 36.9 °C (98.4 °F)   Ht 1.778 m (5' 10\")   SpO2 96%   BMI 30.28 kg/m²     Physical Exam  General Appearance: Alert and cooperative, in no acute distress, well-developed/well-nourished, overweight male.    Neck: Supple and without adenopathy or rigidity.  There is no JVD at 90° and no carotid bruits are noted.  There is no thyromegaly, thyroid tenderness, or palpable thyroid nodules.  Heart: Regular rate and rhythm without murmur or ectopy.  Respiratory: Lungs are clear to auscultation bilaterally with good air exchange.  Good respiratory effort and no accessory muscle use.  Skin: Good turgor, moist, warm and without rashes or lesions.  Neurological exam: Alert and oriented ×3, no tremor, normal gait.  Extremities: No clubbing, cyanosis, or edema    Assessment/Plan     Benign essential hypertension  Stable on in office readings.  Continue the current medication.     Mixed hyperlipidemia  Stable on last labs.  Diet changes recommended.  Continue the current medication.     Type 2 diabetes mellitus without complication, without long-term current use of insulin  Well controlled at this time.  Last A1c was:  Lab Results   Component Value Date    HGBA1C 6.1 (H) 07/22/2024   Continue the current medications.     Vitamin D deficiency  4/1/24 labs revealed Vit D level of 32.  Pt was to increase his Vit D supplementation to 8000 units 1 day per week and 4000 units 6 days per week.  7/29/2024:  Continue current treatment plan.     Follow up in 3 months.        Scribe Attestation  By signing my name below, IMarino Scribe   attest that this documentation has been prepared under the direction and in the presence of Stan Jensen DO.    Orders Placed This " Encounter   Procedures    Prostate Specific Antigen, Screen    Albumin-Creatinine Ratio, Urine Random    Basic Metabolic Panel    Lipid Panel    Hemoglobin A1C     Requested Prescriptions     Signed Prescriptions Disp Refills    dulaglutide (Trulicity) 1.5 mg/0.5 mL pen injector injection 12 each 0     Sig: Inject 1.5 mg under the skin 1 (one) time per week.

## 2024-08-10 ENCOUNTER — HOSPITAL ENCOUNTER (EMERGENCY)
Age: 54
Discharge: HOME | End: 2024-08-10
Payer: COMMERCIAL

## 2024-08-10 ENCOUNTER — APPOINTMENT (OUTPATIENT)
Dept: GENERAL RADIOLOGY | Age: 54
End: 2024-08-10
Payer: COMMERCIAL

## 2024-08-10 VITALS
BODY MASS INDEX: 30.06 KG/M2 | OXYGEN SATURATION: 96 % | WEIGHT: 210 LBS | TEMPERATURE: 98 F | RESPIRATION RATE: 18 BRPM | HEIGHT: 70 IN | SYSTOLIC BLOOD PRESSURE: 116 MMHG | HEART RATE: 54 BPM | DIASTOLIC BLOOD PRESSURE: 72 MMHG

## 2024-08-10 DIAGNOSIS — S61.112A LACERATION OF LEFT THUMB WITHOUT FOREIGN BODY WITH DAMAGE TO NAIL, INITIAL ENCOUNTER: Primary | ICD-10-CM

## 2024-08-10 PROCEDURE — 73130 X-RAY EXAM OF HAND: CPT

## 2024-08-10 PROCEDURE — 99284 EMERGENCY DEPT VISIT MOD MDM: CPT

## 2024-08-10 PROCEDURE — 12001 RPR S/N/AX/GEN/TRNK 2.5CM/<: CPT

## 2024-08-10 PROCEDURE — 6370000000 HC RX 637 (ALT 250 FOR IP): Performed by: PHYSICIAN ASSISTANT

## 2024-08-10 PROCEDURE — 6360000002 HC RX W HCPCS: Performed by: PHYSICIAN ASSISTANT

## 2024-08-10 PROCEDURE — 90471 IMMUNIZATION ADMIN: CPT | Performed by: PHYSICIAN ASSISTANT

## 2024-08-10 PROCEDURE — 2500000003 HC RX 250 WO HCPCS: Performed by: PHYSICIAN ASSISTANT

## 2024-08-10 PROCEDURE — 90715 TDAP VACCINE 7 YRS/> IM: CPT | Performed by: PHYSICIAN ASSISTANT

## 2024-08-10 RX ORDER — OXYCODONE HYDROCHLORIDE AND ACETAMINOPHEN 5; 325 MG/1; MG/1
1 TABLET ORAL ONCE
Status: COMPLETED | OUTPATIENT
Start: 2024-08-10 | End: 2024-08-10

## 2024-08-10 RX ORDER — OXYCODONE HYDROCHLORIDE AND ACETAMINOPHEN 5; 325 MG/1; MG/1
1 TABLET ORAL EVERY 8 HOURS PRN
Qty: 9 TABLET | Refills: 0 | Status: SHIPPED | OUTPATIENT
Start: 2024-08-10 | End: 2024-08-13

## 2024-08-10 RX ORDER — LIDOCAINE HYDROCHLORIDE 10 MG/ML
5 INJECTION, SOLUTION EPIDURAL; INFILTRATION; INTRACAUDAL; PERINEURAL ONCE
Status: COMPLETED | OUTPATIENT
Start: 2024-08-10 | End: 2024-08-10

## 2024-08-10 RX ORDER — CEPHALEXIN 500 MG/1
500 CAPSULE ORAL 4 TIMES DAILY
Qty: 28 CAPSULE | Refills: 0 | Status: SHIPPED | OUTPATIENT
Start: 2024-08-10 | End: 2024-08-17

## 2024-08-10 RX ADMIN — LIDOCAINE HYDROCHLORIDE 5 ML: 10 INJECTION, SOLUTION EPIDURAL; INFILTRATION; INTRACAUDAL; PERINEURAL at 11:03

## 2024-08-10 RX ADMIN — TETANUS TOXOID, REDUCED DIPHTHERIA TOXOID AND ACELLULAR PERTUSSIS VACCINE, ADSORBED 0.5 ML: 5; 2.5; 8; 8; 2.5 SUSPENSION INTRAMUSCULAR at 10:51

## 2024-08-10 RX ADMIN — OXYCODONE HYDROCHLORIDE AND ACETAMINOPHEN 1 TABLET: 5; 325 TABLET ORAL at 10:51

## 2024-08-10 ASSESSMENT — PAIN SCALES - GENERAL
PAINLEVEL_OUTOF10: 3

## 2024-08-10 ASSESSMENT — PAIN - FUNCTIONAL ASSESSMENT
PAIN_FUNCTIONAL_ASSESSMENT: 0-10
PAIN_FUNCTIONAL_ASSESSMENT: 0-10
PAIN_FUNCTIONAL_ASSESSMENT: PREVENTS OR INTERFERES SOME ACTIVE ACTIVITIES AND ADLS

## 2024-08-10 ASSESSMENT — PAIN DESCRIPTION - LOCATION
LOCATION: FINGER (COMMENT WHICH ONE)
LOCATION: HAND
LOCATION: HAND

## 2024-08-10 ASSESSMENT — PAIN DESCRIPTION - DESCRIPTORS
DESCRIPTORS: THROBBING
DESCRIPTORS: ACHING

## 2024-08-10 ASSESSMENT — ENCOUNTER SYMPTOMS
SHORTNESS OF BREATH: 0
ABDOMINAL PAIN: 0

## 2024-08-10 ASSESSMENT — PAIN DESCRIPTION - ORIENTATION
ORIENTATION: LEFT
ORIENTATION: LEFT

## 2024-08-10 ASSESSMENT — PAIN DESCRIPTION - FREQUENCY: FREQUENCY: CONTINUOUS

## 2024-08-10 ASSESSMENT — PAIN DESCRIPTION - PAIN TYPE: TYPE: ACUTE PAIN

## 2024-08-10 NOTE — ED PROVIDER NOTES
[facility] ED  eMERGENCY dEPARTMENT eNCOUnter      Pt Name: Bang Pompa  MRN: [id number]  Birthdate 1970  Date of evaluation: 8/10/2024  Provider: Mikki Rodriguez PA-C        HISTORY OF PRESENT ILLNESS    Bang Pompa is a 54 y.o. male per chart review has ah/o DM; presenting to the ED for acute left thumb laceration that he sustained 30 min PTA. Hasn't taken anything for pain. Apparently, patient was using a miter saw and a piece of wood flipped up and squeeze his thumb between 2 pieces of wood. Last tdap is unknown. Wound is on the DIP of the left thumb. Denies any other physical complaints.         REVIEW OF SYSTEMS       Review of Systems   Constitutional:  Negative for fever.   Respiratory:  Negative for shortness of breath.    Cardiovascular:  Negative for chest pain.   Gastrointestinal:  Negative for abdominal pain.   Skin:  Positive for wound.   Neurological:  Negative for weakness and numbness.   All other systems reviewed and are negative.      Except as noted above the remainder of the review of systems was reviewed and negative.       PAST MEDICAL HISTORY     Past Medical History:   Diagnosis Date    Diabetes mellitus (HCC)          SURGICAL HISTORY       Past Surgical History:   Procedure Laterality Date    CYST REMOVAL      tailbone    LITHOTRIPSY  2012    VASECTOMY           CURRENT MEDICATIONS       Previous Medications    ASPIRIN 81 MG TABLET    Take 81 mg by mouth daily    ATORVASTATIN (LIPITOR) 20 MG TABLET    TAKE 1 TABLET DAILY    CYCLOBENZAPRINE (FLEXERIL) 10 MG TABLET    Take 10 mg by mouth 3 times daily as needed for Muscle spasms    ESCITALOPRAM (LEXAPRO) 10 MG TABLET    TAKE 1 TABLET BY MOUTH EVERY DAY    METFORMIN (GLUCOPHAGE) 500 MG TABLET    Take 500 mg by mouth 2 times daily (with meals)    NICOTINE STEP 1 21 MG/24HR    APPLY 1 PATCH DAILY    OXCARBAZEPINE (TRILEPTAL) 150 MG TABLET        RISPERIDONE (RISPERDAL) 1 MG TABLET    TAKE 1 TABLET TWICE DAILY.    TRAZODONE  yes      Patient identity confirmed:  Verbally with patient  Anesthesia:     Anesthesia method:  Local infiltration    Local anesthetic:  Lidocaine 1% w/o epi  Laceration details:     Location:  Finger    Finger location:  L thumb    Wound length (cm): 1in.    Laceration depth: 1.  Pre-procedure details:     Preparation:  Patient was prepped and draped in usual sterile fashion and imaging obtained to evaluate for foreign bodies  Exploration:     Hemostasis achieved with:  Direct pressure    Imaging outcome: foreign body not noted      Wound exploration: wound explored through full range of motion and entire depth of wound visualized      Wound extent: underlying fracture      Wound extent: no foreign bodies/material noted      Contaminated: no    Treatment:     Area cleansed with:  Povidone-iodine and saline    Amount of cleaning:  Extensive    Irrigation solution:  Sterile saline    Irrigation method:  Pressure wash    Visualized foreign bodies/material removed: no      Debridement:  None  Skin repair:     Repair method:  Sutures    Suture size:  5-0    Suture material:  Nylon    Suture technique:  Simple interrupted    Number of sutures:  4  Approximation:     Approximation:  Close  Repair type:     Repair type:  Simple  Post-procedure details:     Dressing:  Non-adherent dressing    Procedure completion:  Tolerated        FINAL IMPRESSION      1. Laceration of left thumb without foreign body with damage to nail, initial encounter          DISPOSITION/PLAN   DISPOSITION Decision To Discharge 08/10/2024 12:28:33 PM  Nursing notes, medical records and triage notes reviewed. Vital signs reviewed.     This is a 54 year old male per chart review has ah/o DM; presenting to the ED for acute left thumb laceration that he sustained 30 min PTA. Hasn't taken anything for pain. Apparently, patient was using a miter saw and a piece of wood flipped up and squeeze his thumb between 2 pieces of wood. Last tdap is unknown. Wound

## 2024-08-12 ENCOUNTER — OFFICE VISIT (OUTPATIENT)
Dept: ORTHOPEDIC SURGERY | Facility: CLINIC | Age: 54
End: 2024-08-12
Payer: COMMERCIAL

## 2024-08-12 DIAGNOSIS — S62.522B DISPLACED FRACTURE OF DISTAL PHALANX OF LEFT THUMB, INITIAL ENCOUNTER FOR OPEN FRACTURE: Primary | ICD-10-CM

## 2024-08-12 PROCEDURE — 99214 OFFICE O/P EST MOD 30 MIN: CPT | Mod: 57 | Performed by: STUDENT IN AN ORGANIZED HEALTH CARE EDUCATION/TRAINING PROGRAM

## 2024-08-12 PROCEDURE — 99204 OFFICE O/P NEW MOD 45 MIN: CPT | Performed by: STUDENT IN AN ORGANIZED HEALTH CARE EDUCATION/TRAINING PROGRAM

## 2024-08-12 PROCEDURE — 3062F POS MACROALBUMINURIA REV: CPT | Performed by: STUDENT IN AN ORGANIZED HEALTH CARE EDUCATION/TRAINING PROGRAM

## 2024-08-12 PROCEDURE — 3044F HG A1C LEVEL LT 7.0%: CPT | Performed by: STUDENT IN AN ORGANIZED HEALTH CARE EDUCATION/TRAINING PROGRAM

## 2024-08-12 PROCEDURE — 3048F LDL-C <100 MG/DL: CPT | Performed by: STUDENT IN AN ORGANIZED HEALTH CARE EDUCATION/TRAINING PROGRAM

## 2024-08-12 PROCEDURE — 99211 OFF/OP EST MAY X REQ PHY/QHP: CPT | Performed by: STUDENT IN AN ORGANIZED HEALTH CARE EDUCATION/TRAINING PROGRAM

## 2024-08-12 RX ORDER — CEPHALEXIN 500 MG/1
500 CAPSULE ORAL EVERY 12 HOURS
Qty: 20 CAPSULE | Refills: 0 | Status: SHIPPED | OUTPATIENT
Start: 2024-08-12 | End: 2024-08-22

## 2024-08-12 RX ORDER — IBUPROFEN 800 MG/1
800 TABLET ORAL 3 TIMES DAILY
Qty: 90 TABLET | Refills: 0 | Status: SHIPPED | OUTPATIENT
Start: 2024-08-12 | End: 2024-09-11

## 2024-08-12 RX ORDER — OXYCODONE AND ACETAMINOPHEN 5; 325 MG/1; MG/1
1 TABLET ORAL EVERY 6 HOURS PRN
Qty: 5 TABLET | Refills: 0 | Status: SHIPPED | OUTPATIENT
Start: 2024-08-12 | End: 2024-08-19

## 2024-08-12 NOTE — PROGRESS NOTES
History of Present Illness:  Presents for evaluation of left thumb.  The patient sustained an acute injury and notes pain and swelling.  He was working with a chop saw and landed thumb.  The pain is sharp in nature, severe, worse with movement and better with rest.    Seen at [facility] for Keflex given pain meds and tetanus updated.    Review of Systems   GENERAL: Negative for malaise, significant weight loss, fever  MUSCULOSKELETAL: see HPI  NEURO:  Negative    The patient's past medical history, family history, social history, and review of systems were reviewed. History is otherwise negative except as stated in the HPI.    Physical Examination:  General: Alert and oriented to person, place, and time.  No acute distress and breathing comfortably: Pleasant and cooperative with examination.  HEENT: Head is normocephalic and atraumatic.  Neck: Supple, no visible swelling.  Cardiovascular: No palpable tachycardia  Lungs: No audible wheezing or labored breathing  Abdomen: Nondistended.  On musculoskeletal examination, the patient has full elbow range of motion. In regards to the hand, there is swelling with some deformity. There is ecchymosis of the thumb and a laceration involving the nailbed.  Nail has been splinted over this.  Range of motion and strength are limited secondary to pain. There is tenderness to palpation of the left thumb distal phalanx. No scissoring with flexion or evidence of malrotation. There is no obvious tenderness to palpation about the scaphoid or the SL interval, or distal radius. Sensation and motor function are intact in the radial, ulnar, and median nerve distribution. The patient has intact flexor and extensor function, but has difficulty making a full fist secondary to pain. The hand itself is warm and well perfused. The contralateral hand and wrist are normal to inspection, range of motion, stability, and strength.      Imaging:  AP, lateral, and oblique radiographs of the left thumb  demonstrate distal phalanx fracture    Assessment:  Patient with a open left thumb distal phalanx fracture and nailbed injury    Plan:  I had long discussion with the patient regarding the fracture. I discussed the risks/benefits/expected outcomes of both non-operative and operative management. Based on the current alignment of the fracture, the patients medical history, and the patients preference, we have elected to proceed with operative management in the form of irrigation and debridement and nailbed repair.  Continue Keflex in the interim.  Follow-up Friday for surgery    Follow up: Friday for surgery    Bianca Reyes MD

## 2024-08-16 DIAGNOSIS — S62.522B DISPLACED FRACTURE OF DISTAL PHALANX OF LEFT THUMB, INITIAL ENCOUNTER FOR OPEN FRACTURE: Primary | ICD-10-CM

## 2024-08-16 PROCEDURE — 11760 REPAIR OF NAIL BED: CPT | Performed by: STUDENT IN AN ORGANIZED HEALTH CARE EDUCATION/TRAINING PROGRAM

## 2024-08-16 NOTE — PROGRESS NOTES
Left thumb nailbed repair and irrigation and debridement of open fracture  PREOPERATIVE DIAGNOSIS:  Left thumb nailbed injury with Open Fracture  POSTOPERATIVE DIAGNOSIS: Left thumb nailbed injury with Open Fracture  PROCEDURE:                              (1)  Finger Distal Phalanx debridement (68477)  (2) left thumb nailbed repair      SURGEON:                                    BIANCA TATUM MD    ANESTHESIA:                              MAC and Local.  COMPLICATIONS:                     None.   INDICATIONS FOR SURGERY:    The patient presents with left thumb saw injury.  Presented with a nailbed injury and open distal phalanx fracture.  Plan to proceed with irrigation debridement of fracture and nailbed repair.  The patient understands the risks of the injury and surgery which include infection, hook nail deformity, cold intolerance, stiffness, decreased  strength, and tip hypersensitivity. The patient understands and consents to surgery.  DESCRIPTION OF PROCEDURE:  The patient was brought to the operating room and identified by me and the surgical staff. A digital block was placed with local anesthesia at the base of the finger.  MAC anesthesia also administered.  The operative limb was then prepped and draped in standard sterile fashion. A finger tourniquet was applied.  The fingertip was debrided of devitalized tissue including skin, subQ, and bone.  Nail was removed and nailbed repaired with 5-0 chromic.  Copious irrigation performed.  The tourniquet was dropped and good color to the fingertip was confirmed. A fingertip dressing was applied.  I was present for the entire length of the case.    Bianca Reyes MD

## 2024-08-19 ENCOUNTER — APPOINTMENT (OUTPATIENT)
Dept: CARDIOLOGY | Facility: CLINIC | Age: 54
End: 2024-08-19
Payer: COMMERCIAL

## 2024-08-19 PROCEDURE — 88305 TISSUE EXAM BY PATHOLOGIST: CPT

## 2024-08-19 PROCEDURE — 88342 IMHCHEM/IMCYTCHM 1ST ANTB: CPT | Performed by: STUDENT IN AN ORGANIZED HEALTH CARE EDUCATION/TRAINING PROGRAM

## 2024-08-19 PROCEDURE — 88305 TISSUE EXAM BY PATHOLOGIST: CPT | Performed by: STUDENT IN AN ORGANIZED HEALTH CARE EDUCATION/TRAINING PROGRAM

## 2024-08-19 PROCEDURE — 88342 IMHCHEM/IMCYTCHM 1ST ANTB: CPT

## 2024-08-20 ENCOUNTER — LAB REQUISITION (OUTPATIENT)
Dept: LAB | Facility: HOSPITAL | Age: 54
End: 2024-08-20
Payer: COMMERCIAL

## 2024-08-26 LAB
LAB AP ASR DISCLAIMER: NORMAL
LABORATORY COMMENT REPORT: NORMAL
PATH REPORT.FINAL DX SPEC: NORMAL
PATH REPORT.GROSS SPEC: NORMAL
PATH REPORT.RELEVANT HX SPEC: NORMAL
PATH REPORT.TOTAL CANCER: NORMAL

## 2024-08-30 ENCOUNTER — OFFICE VISIT (OUTPATIENT)
Dept: ORTHOPEDIC SURGERY | Facility: CLINIC | Age: 54
End: 2024-08-30
Payer: COMMERCIAL

## 2024-08-30 ENCOUNTER — HOSPITAL ENCOUNTER (OUTPATIENT)
Dept: RADIOLOGY | Facility: CLINIC | Age: 54
Discharge: HOME | End: 2024-08-30
Payer: COMMERCIAL

## 2024-08-30 DIAGNOSIS — S62.522B DISPLACED FRACTURE OF DISTAL PHALANX OF LEFT THUMB, INITIAL ENCOUNTER FOR OPEN FRACTURE: Primary | ICD-10-CM

## 2024-08-30 DIAGNOSIS — S62.522B DISPLACED FRACTURE OF DISTAL PHALANX OF LEFT THUMB, INITIAL ENCOUNTER FOR OPEN FRACTURE: ICD-10-CM

## 2024-08-30 PROCEDURE — 99211 OFF/OP EST MAY X REQ PHY/QHP: CPT | Performed by: STUDENT IN AN ORGANIZED HEALTH CARE EDUCATION/TRAINING PROGRAM

## 2024-08-30 PROCEDURE — 73140 X-RAY EXAM OF FINGER(S): CPT | Mod: LT

## 2024-08-30 NOTE — PROGRESS NOTES
S/p left thumb irrigation debridement of open fracture and nailbed repair on 8/16/2024. Doing well. Denies numbness or tingling.     Physical Examination:  The patient appears to be their stated age, is in no apparent distress, and is oriented x3. The patients mood and affect are appropriate. The patients gait is normal. The examination of the limb in question was performed in comparison to the contralateral limb.    Nailbed well-healing without erythema warmth or signs of infection  AIN, PIN, ulnar intact  SILT A/R/U/M  Hand wwp    Radiographs  Demonstrate a interval callus formation of the left distal phalanx fracture    Assessment:  2 weeks post op    Plan:   Dressing removed.  Okay to leave open to air.  Need to keep clean.  Follow-up in 2 weeks for clinical check and return to work note    Bianca Gaspar MD

## 2024-09-12 DIAGNOSIS — S62.522B DISPLACED FRACTURE OF DISTAL PHALANX OF LEFT THUMB, INITIAL ENCOUNTER FOR OPEN FRACTURE: ICD-10-CM

## 2024-09-12 RX ORDER — IBUPROFEN 800 MG/1
800 TABLET ORAL 3 TIMES DAILY
Qty: 90 TABLET | Refills: 0 | Status: SHIPPED | OUTPATIENT
Start: 2024-09-12 | End: 2024-10-12

## 2024-09-13 ENCOUNTER — OFFICE VISIT (OUTPATIENT)
Dept: ORTHOPEDIC SURGERY | Facility: CLINIC | Age: 54
End: 2024-09-13
Payer: COMMERCIAL

## 2024-09-13 VITALS — HEIGHT: 70 IN | BODY MASS INDEX: 30.06 KG/M2 | WEIGHT: 210 LBS

## 2024-09-13 DIAGNOSIS — S62.522B DISPLACED FRACTURE OF DISTAL PHALANX OF LEFT THUMB, INITIAL ENCOUNTER FOR OPEN FRACTURE: Primary | ICD-10-CM

## 2024-09-13 PROCEDURE — 99211 OFF/OP EST MAY X REQ PHY/QHP: CPT | Performed by: STUDENT IN AN ORGANIZED HEALTH CARE EDUCATION/TRAINING PROGRAM

## 2024-09-13 ASSESSMENT — PATIENT HEALTH QUESTIONNAIRE - PHQ9
SUM OF ALL RESPONSES TO PHQ9 QUESTIONS 1 AND 2: 0
SUM OF ALL RESPONSES TO PHQ9 QUESTIONS 1 AND 2: 0
1. LITTLE INTEREST OR PLEASURE IN DOING THINGS: NOT AT ALL
2. FEELING DOWN, DEPRESSED OR HOPELESS: NOT AT ALL
2. FEELING DOWN, DEPRESSED OR HOPELESS: NOT AT ALL
1. LITTLE INTEREST OR PLEASURE IN DOING THINGS: NOT AT ALL

## 2024-09-13 NOTE — PROGRESS NOTES
S/p left thumb irrigation debridement of open fracture and nailbed repair on 8/16/2024. Doing well. Denies numbness or tingling.  Considerable improvement in pain over the past week and a half    Physical Examination:  The patient appears to be their stated age, is in no apparent distress, and is oriented x3. The patients mood and affect are appropriate. The patients gait is normal. The examination of the limb in question was performed in comparison to the contralateral limb.    Nailbed well-healing without erythema warmth or signs of infection  Well-healed laceration.  No sensitivity throughout  AIN, PIN, ulnar intact  SILT A/R/U/M  Hand wwp    Radiographs  Demonstrate a interval callus formation of the left distal phalanx fracture    Assessment:  4 weeks post op    Plan:   Can progress to weight-bear as tolerated.  Return to work note provided today.  Follow-up in 4 weeks for clinical check     Bianca Gaspar MD

## 2024-09-13 NOTE — LETTER
September 13, 2024     Patient: Phi Viera   YOB: 1970   Date of Visit: 9/13/2024       To Whom It May Concern:    It is my medical opinion that Phi Viera may return to work on 9/16/24 full duty .    If you have any questions or concerns, please don't hesitate to call.         Sincerely,        Bianca Reyes MD

## 2024-10-14 ENCOUNTER — APPOINTMENT (OUTPATIENT)
Dept: ORTHOPEDIC SURGERY | Facility: CLINIC | Age: 54
End: 2024-10-14
Payer: COMMERCIAL

## 2024-11-11 ENCOUNTER — LAB (OUTPATIENT)
Dept: LAB | Facility: LAB | Age: 54
End: 2024-11-11
Payer: COMMERCIAL

## 2024-11-11 DIAGNOSIS — E78.2 MIXED HYPERLIPIDEMIA: ICD-10-CM

## 2024-11-11 DIAGNOSIS — I10 BENIGN ESSENTIAL HYPERTENSION: ICD-10-CM

## 2024-11-11 DIAGNOSIS — Z12.5 PROSTATE CANCER SCREENING: ICD-10-CM

## 2024-11-11 DIAGNOSIS — E11.9 TYPE 2 DIABETES MELLITUS WITHOUT COMPLICATION, WITHOUT LONG-TERM CURRENT USE OF INSULIN (MULTI): ICD-10-CM

## 2024-11-11 LAB
ANION GAP SERPL CALC-SCNC: 10 MMOL/L (ref 10–20)
BUN SERPL-MCNC: 9 MG/DL (ref 6–23)
CALCIUM SERPL-MCNC: 9.4 MG/DL (ref 8.6–10.3)
CHLORIDE SERPL-SCNC: 107 MMOL/L (ref 98–107)
CHOLEST SERPL-MCNC: 143 MG/DL (ref 0–199)
CHOLESTEROL/HDL RATIO: 4.2
CO2 SERPL-SCNC: 27 MMOL/L (ref 21–32)
CREAT SERPL-MCNC: 1.04 MG/DL (ref 0.5–1.3)
CREAT UR-MCNC: 78.2 MG/DL (ref 20–370)
EGFRCR SERPLBLD CKD-EPI 2021: 86 ML/MIN/1.73M*2
EST. AVERAGE GLUCOSE BLD GHB EST-MCNC: 143 MG/DL
GLUCOSE SERPL-MCNC: 144 MG/DL (ref 74–99)
HBA1C MFR BLD: 6.6 %
HDLC SERPL-MCNC: 34.4 MG/DL
LDLC SERPL CALC-MCNC: 53 MG/DL
MICROALBUMIN UR-MCNC: <7 MG/L
MICROALBUMIN/CREAT UR: NORMAL MG/G{CREAT}
NON HDL CHOLESTEROL: 109 MG/DL (ref 0–149)
POTASSIUM SERPL-SCNC: 4 MMOL/L (ref 3.5–5.3)
PSA SERPL-MCNC: 0.16 NG/ML
SODIUM SERPL-SCNC: 140 MMOL/L (ref 136–145)
TRIGL SERPL-MCNC: 276 MG/DL (ref 0–149)
VLDL: 55 MG/DL (ref 0–40)

## 2024-11-11 PROCEDURE — 36415 COLL VENOUS BLD VENIPUNCTURE: CPT

## 2024-11-11 PROCEDURE — 84153 ASSAY OF PSA TOTAL: CPT

## 2024-11-11 PROCEDURE — 80048 BASIC METABOLIC PNL TOTAL CA: CPT

## 2024-11-11 PROCEDURE — 82570 ASSAY OF URINE CREATININE: CPT

## 2024-11-11 PROCEDURE — 83036 HEMOGLOBIN GLYCOSYLATED A1C: CPT

## 2024-11-11 PROCEDURE — 82043 UR ALBUMIN QUANTITATIVE: CPT

## 2024-11-11 PROCEDURE — 80061 LIPID PANEL: CPT

## 2024-11-23 ENCOUNTER — APPOINTMENT (OUTPATIENT)
Dept: PRIMARY CARE | Facility: CLINIC | Age: 54
End: 2024-11-23
Payer: COMMERCIAL

## 2024-11-23 VITALS
SYSTOLIC BLOOD PRESSURE: 116 MMHG | BODY MASS INDEX: 30.13 KG/M2 | OXYGEN SATURATION: 94 % | DIASTOLIC BLOOD PRESSURE: 68 MMHG | HEIGHT: 70 IN | HEART RATE: 73 BPM | TEMPERATURE: 97.7 F

## 2024-11-23 DIAGNOSIS — E78.2 MIXED HYPERLIPIDEMIA: ICD-10-CM

## 2024-11-23 DIAGNOSIS — F17.210 CIGARETTE SMOKER: ICD-10-CM

## 2024-11-23 DIAGNOSIS — I10 BENIGN ESSENTIAL HYPERTENSION: Primary | ICD-10-CM

## 2024-11-23 DIAGNOSIS — E11.9 TYPE 2 DIABETES MELLITUS WITHOUT COMPLICATION, WITHOUT LONG-TERM CURRENT USE OF INSULIN (MULTI): ICD-10-CM

## 2024-11-23 DIAGNOSIS — E55.9 VITAMIN D DEFICIENCY: ICD-10-CM

## 2024-11-23 PROCEDURE — 3078F DIAST BP <80 MM HG: CPT | Performed by: FAMILY MEDICINE

## 2024-11-23 PROCEDURE — 3074F SYST BP LT 130 MM HG: CPT | Performed by: FAMILY MEDICINE

## 2024-11-23 PROCEDURE — 99214 OFFICE O/P EST MOD 30 MIN: CPT | Performed by: FAMILY MEDICINE

## 2024-11-23 RX ORDER — ICOSAPENT ETHYL 1000 MG/1
2 CAPSULE ORAL 2 TIMES DAILY
Qty: 360 CAPSULE | Refills: 0 | Status: SHIPPED | OUTPATIENT
Start: 2024-11-23 | End: 2024-11-25 | Stop reason: SDUPTHER

## 2024-11-23 RX ORDER — DULAGLUTIDE 1.5 MG/.5ML
1.5 INJECTION, SOLUTION SUBCUTANEOUS
Qty: 12 EACH | Refills: 0 | Status: SHIPPED | OUTPATIENT
Start: 2024-11-24 | End: 2025-02-22

## 2024-11-23 ASSESSMENT — PATIENT HEALTH QUESTIONNAIRE - PHQ9
1. LITTLE INTEREST OR PLEASURE IN DOING THINGS: NOT AT ALL
2. FEELING DOWN, DEPRESSED OR HOPELESS: NOT AT ALL
SUM OF ALL RESPONSES TO PHQ9 QUESTIONS 1 AND 2: 0

## 2024-11-23 NOTE — PATIENT INSTRUCTIONS
Follow up in 3 months with labs to be done PRIOR.    It was a pleasure to see you today. Thank you for choosing us for your health care needs.    If you have lab or other testing completed and have not been informed of results within one week, please call the office for your results.    If you haven't done so, consider signing up for [facility] [employer], the [facility] personal health record. Ask the staff how you can get started.

## 2024-11-23 NOTE — PROGRESS NOTES
Subjective   Patient ID: Phi Viera is a 54 y.o. male who presents for Follow-up.    HPI     No new concerns at this time    Pt did note that he started smoking again about 2 months ago.  Has stress with work that makes it more difficult to stop smoking.    Labs&PSA: 11/11/2024  Colonoscopy: 10/2022    Patient has HTN.  He does not monitor his BP at home.   He reports that cardiology started him on amlodipine for elevated BP.  Denies chest pain, dizziness, LE swelling.      He has type 2 diabetes.  He does monitor his sugars at home & states they are improving but have not returned to previous baseline.   Pt denies any polyuria, polydipsia, polyphagia.     He has hyperlipidemia.  Patient tries to limit the amount of fatty foods and high cholesterol foods that are consumed.  He has been compliant with current medication and denies any noted side effects.  HE HAS BEEN INTOLERANT OF MULTIPLE STATINS DUE TO MYALGIA.     He has known vitamin D deficiency.  Pt has been compliant with Vit D supplementation.     He has depression.  Symptoms have been stable.  He continues to follow with his psychiatrist on a regular basis.         Review of Systems  Constitutional: Patient denies any fever, chills, loss of appetite, or unexplained weight loss.  Cardiovascular: Patient denies any chest pain, shortness of breath with exertion, tachycardia, palpitations, orthopnea, or paroxysmal nocturnal dyspnea.  Respiratory: Patient denies any cough, shortness breath, or wheezing.  Gastrointestinal: Patient denies any nausea, vomiting, diarrhea, constipation, melena, hematochezia, or reflux symptoms.  Skin: Denies any rashes or skin lesions.   Neurology: Patient denies any new motor or sensory losses.  Denies any numbness, tingling, weakness, and incoordination of the extremities.  Patient also denies any tremor, seizures, or gait instability.  Endocrinology: Denies any polyuria, polydipsia, polyphagia, or heat/cold  "intolerance.      Objective   /68   Pulse 73   Temp 36.5 °C (97.7 °F)   Ht 1.778 m (5' 10\")   SpO2 94%   BMI 30.13 kg/m²     Physical Exam  General Appearance: Alert and cooperative, in no acute distress, well-developed/well-nourished.  Neck: Supple and without adenopathy or rigidity.  There is no JVD at 90° and no carotid bruits are noted.  There is no thyromegaly, thyroid tenderness, or palpable thyroid nodules.  Heart: Regular rate and rhythm without murmur or ectopy.  Respiratory: Lungs are clear to auscultation bilaterally with good air exchange.  Good respiratory effort and no accessory muscle use.  Skin: Good turgor, moist, warm and without rashes or lesions.  Neurological exam: Alert and oriented ×3, no tremor, normal gait.  Extremities: No clubbing, cyanosis, or edema      Assessment/Plan     HTN:    Stable based on in office readings.  Blood pressure appears adequately controlled and we will continue with the current antihypertensive therapy.    Hyperlipidemia:   Stable based on labs.  Patient will continue with the current medication.  Dietary changes, exercise, and maintenance of healthy weight were discussed at length.  Lab Results   Component Value Date    CHOL 143 11/11/2024    CHOL 124 04/01/2024    CHOL 120 10/16/2023     Lab Results   Component Value Date    HDL 34.4 11/11/2024    HDL 36.9 04/01/2024    HDL 32.1 10/16/2023     Lab Results   Component Value Date    LDLCALC 53 11/11/2024    LDLCALC 44 04/01/2024    LDLCALC 41 10/16/2023     Lab Results   Component Value Date    TRIG 276 (H) 11/11/2024    TRIG 215 (H) 04/01/2024    TRIG 234 (H) 10/16/2023         Type 2 diabetes mellitus without complication, without long-term current use of insulin  Well controlled at this time.  Last A1c was:  Lab Results   Component Value Date    HGBA1C 6.6 (H) 11/11/2024   Continue the current medications.     Vitamin D deficiency  4/1/24 labs revealed Vit D level of 32.  Pt was to increase his Vit D " supplementation to 8000 units 1 day per week and 4000 units 6 days per week.  Continue current treatment plan.  Lab Results   Component Value Date    VITD25 32 04/01/2024    VITD25 25 (L) 01/15/2024    VITD25 36 04/03/2023     Cigarette smoker:  He recently started smoking again.  Patient understands that continued smoking will increase the risks of lung disease, vascular disease, and multiple malignancies.  Pt. has been encouraged to work on smoking cessation and available smoking cessation aids were discussed.  Declines any smoking cessation assistance at this time.      Follow up in 3 months.        Orders Placed This Encounter   Procedures    Hemoglobin A1C    Comprehensive Metabolic Panel    TSH with reflex to Free T4 if abnormal    Vitamin D 25-Hydroxy,Total (for eval of Vitamin D levels)     Requested Prescriptions     Signed Prescriptions Disp Refills    dulaglutide (Trulicity) 1.5 mg/0.5 mL pen injector injection 12 each 0     Sig: Inject 1.5 mg under the skin 1 (one) time per week.

## 2024-11-25 DIAGNOSIS — E78.2 MIXED HYPERLIPIDEMIA: ICD-10-CM

## 2024-11-25 RX ORDER — ICOSAPENT ETHYL 1000 MG/1
2 CAPSULE ORAL 2 TIMES DAILY
Qty: 360 CAPSULE | Refills: 0 | OUTPATIENT
Start: 2024-11-25 | End: 2025-02-23

## 2024-11-25 RX ORDER — ICOSAPENT ETHYL 1 G/1
2 CAPSULE ORAL 2 TIMES DAILY
Qty: 360 CAPSULE | Refills: 0 | Status: SHIPPED | OUTPATIENT
Start: 2024-11-25 | End: 2025-02-23

## 2024-11-25 NOTE — TELEPHONE ENCOUNTER
Hello,      The prescription sent on Nov 23, 2024 for Vascepa was D.A.W. The insurance requires the generic. Would you please send a new 90 day script allowing the generic for Vascepa to [facility] in [facility].   Thank you,   Juliane Viera

## 2025-01-04 DIAGNOSIS — K21.9 GASTROESOPHAGEAL REFLUX DISEASE, UNSPECIFIED WHETHER ESOPHAGITIS PRESENT: ICD-10-CM

## 2025-01-06 RX ORDER — OMEPRAZOLE 20 MG/1
CAPSULE, DELAYED RELEASE ORAL
Qty: 90 CAPSULE | Refills: 1 | Status: SHIPPED | OUTPATIENT
Start: 2025-01-06

## 2025-01-21 ENCOUNTER — APPOINTMENT (OUTPATIENT)
Dept: CARDIOLOGY | Facility: CLINIC | Age: 55
End: 2025-01-21
Payer: COMMERCIAL

## 2025-01-21 VITALS
HEIGHT: 70 IN | OXYGEN SATURATION: 97 % | HEART RATE: 76 BPM | SYSTOLIC BLOOD PRESSURE: 120 MMHG | RESPIRATION RATE: 18 BRPM | DIASTOLIC BLOOD PRESSURE: 64 MMHG | WEIGHT: 220.8 LBS | BODY MASS INDEX: 31.61 KG/M2

## 2025-01-21 DIAGNOSIS — E78.5 DYSLIPIDEMIA: ICD-10-CM

## 2025-01-21 DIAGNOSIS — I25.10 CORONARY ARTERY CALCIFICATION: Primary | ICD-10-CM

## 2025-01-21 DIAGNOSIS — I10 BENIGN ESSENTIAL HYPERTENSION: ICD-10-CM

## 2025-01-21 DIAGNOSIS — I10 ESSENTIAL HYPERTENSION, BENIGN: ICD-10-CM

## 2025-01-21 PROBLEM — J01.90 ACUTE SINUSITIS: Status: ACTIVE | Noted: 2024-10-07

## 2025-01-21 PROBLEM — J31.0 RHINITIS: Status: ACTIVE | Noted: 2024-10-07

## 2025-01-21 PROBLEM — H69.90 EUSTACHIAN TUBE DISORDER: Status: ACTIVE | Noted: 2024-10-07

## 2025-01-21 PROCEDURE — 3074F SYST BP LT 130 MM HG: CPT | Performed by: NURSE PRACTITIONER

## 2025-01-21 PROCEDURE — 99214 OFFICE O/P EST MOD 30 MIN: CPT | Performed by: NURSE PRACTITIONER

## 2025-01-21 PROCEDURE — 3008F BODY MASS INDEX DOCD: CPT | Performed by: NURSE PRACTITIONER

## 2025-01-21 PROCEDURE — 3078F DIAST BP <80 MM HG: CPT | Performed by: NURSE PRACTITIONER

## 2025-01-21 RX ORDER — AMLODIPINE BESYLATE 5 MG/1
5 TABLET ORAL DAILY
Qty: 90 TABLET | Refills: 3 | Status: SHIPPED | OUTPATIENT
Start: 2025-01-21 | End: 2026-01-21

## 2025-01-21 NOTE — PROGRESS NOTES
Name : Phi Viera   : 1970   MRN : [id number]   ENC Date : 2025    Primary Cardiologist: Dr. Simeon     CC: DLD, Diabetes, and Nicotine Dependence (6 Month F/U)     HPI:    Phi Viera is a 54 y.o. male with PMHx sig for dyslipidemia, diabetes, anxiety, tobacco use, and a family history of coronary artery disease who presents today for annual cardiovascular follow up.     He has done very well since his last visit. Reports no major health issues and has had no hospitalizations. Denies any chest pain, pressure, SOB/VELAZQUEZ, PND, orthopnea, LE edema, palpitations, lightheadedness, dizziness, or syncope at rest or with exertions. He is compliant with his medications and reports no side effects. He has been physically active:    Continues to work for [employer]. He estimates that he walks about 8 to 12 miles a day.     Unfortunately continues to smoke 1/2 PPD.     Per Reed, he no longer has EMI. States that he lost a lot of weight, had a repeat sleep study & was told that he snores but no EMI. He cannot remember what year that sleep study was.    CV Diagnostics:  Low-dose screening CT 2024: From a cardiovascular perspective patient noted to have coronary artery calcification with CT calcium score of 495.     Treadmill stress test 2022: Exercised 9 minutes on a standard Rodo protocol achieving 11 METS. No evidence of ischemia at maximal workload.     Treadmill stress test 18 demonstrating good heart rate and blood pressure response exercise. Exercise for 10 minutes on a standard Rodo protocol achieving 11.7 metabolic equivalents. No significant ST or T changes noted during exercise. Overall low likelihood of flow-limiting coronary artery disease.     Treadmill nuclear stress test 10/15/14 demonstrating no evidence of exercise-induced ischemia by ECG nor by nuclear imaging. EF 65%.     Echocardiogram 10/15/14 demonstrating normal LV size and function, EF 60-65%. RV normal size and function with  borderline elevated RVSP of 36 mmHg. Trivial TR.     Sleep study 3/21/14 demonstrating no evidence of sleep apnea. The patient was told that he did not need the CPAP however he sleeps much more soundly while he is wearing the CPAP.     ROS: unless otherwise noted in the history of present illness, all other systems were reviewed and they are negative for complaints     Allergies:  Iodides, Atorvastatin, Fluvastatin, Iodinated contrast media, Lovastatin, Pravastatin, Rosuvastatin, Simvastatin, and Statins-hmg-coa reductase inhibitors    Current Outpatient Medications   Medication Instructions    amLODIPine (NORVASC) 5 mg, oral, Daily    aspirin 81 mg, Daily    bempedoic acid-ezetimibe (Nexlizet) 180-10 mg tablet 1 tablet, oral, Daily    blood sugar diagnostic (OneTouch Verio test strips) strip 1 strip, miscellaneous, Daily    cholecalciferol (VITAMIN D-3) 100 mcg, oral, Daily    cyclobenzaprine (FLEXERIL) 10 mg, 3 times daily PRN    docusate sodium (COLACE) 100 mg, oral, Daily    FLUoxetine (PROZAC) 20 mg, Every morning    fluticasone (Flonase) 50 mcg/actuation nasal spray 2 sprays, Daily    icosapent ethyL (VASCEPA) 2 g, oral, 2 times daily    lamoTRIgine (LaMICtal) 25 mg tablet 2 tablets, Daily    omeprazole (PriLOSEC) 20 mg DR capsule TAKE 1 CAPSULE (20 MG) BY MOUTH ONCE DAILY. DO NOT CRUSH, CHEW, OR SPLIT.    risperiDONE (RISPERDAL) 1 mg, 2 times daily    Trulicity 1.5 mg, subcutaneous, Once Weekly    wheat dextrin (Benefiber Sugar Free, dextrin,) 3 gram/4 gram powder Take 1 teaspoon daily with a full glass of water (8 ounces)        Last Labs:  CBC  Lab Results   Component Value Date    WBC 6.6 04/15/2022    HGB 15.3 04/15/2022    HCT 41.6 04/15/2022    MCV 92 04/15/2022     04/15/2022       CMP  Lab Results   Component Value Date    CALCIUM 9.4 11/11/2024    PROT 6.9 07/22/2024    ALBUMIN 4.5 07/22/2024    AST 23 07/22/2024    ALT 23 07/22/2024    ALKPHOS 51 07/22/2024    BILITOT 0.4 07/22/2024       BMP  "  Lab Results   Component Value Date     11/11/2024    K 4.0 11/11/2024     11/11/2024    CO2 27 11/11/2024    GLUCOSE 144 (H) 11/11/2024    BUN 9 11/11/2024    CREATININE 1.04 11/11/2024       LIPID PANEL   Lab Results   Component Value Date    CHOL 143 11/11/2024    TRIG 276 (H) 11/11/2024    HDL 34.4 11/11/2024    CHHDL 4.2 11/11/2024    LDLF 59 04/03/2023    VLDL 55 (H) 11/11/2024    NHDL 109 11/11/2024       RENAL FUNCTION PANEL   Lab Results   Component Value Date    GLUCOSE 144 (H) 11/11/2024     11/11/2024    K 4.0 11/11/2024     11/11/2024    CO2 27 11/11/2024    ANIONGAP 10 11/11/2024    BUN 9 11/11/2024    CREATININE 1.04 11/11/2024    GFRMALE >90 04/03/2023    CALCIUM 9.4 11/11/2024    ALBUMIN 4.5 07/22/2024        Lab Results   Component Value Date    HGBA1C 6.6 (H) 11/11/2024     I have reviewed the above labs & diagnostics    Last Recorded Vitals:  Vitals:    01/21/25 0749   BP: 120/64   BP Location: Left arm   Patient Position: Sitting   Pulse: 76   Resp: 18   SpO2: 97%   Weight: 100 kg (220 lb 12.8 oz)   Height: 1.778 m (5' 10\")     Physical Exam:  He has done very well since his last visit. Reports no major health issues and has had no hospitalizations. Denies any chest pain, pressure, SOB/VELAZQUEZ, PND, orthopnea, LE edema, palpitations, lightheadedness, dizziness, or syncope at rest or with exertions. He is compliant with his medications and reports no side effects. He has been physically active.     Assessment/Plan:  1) dyslipidemia: Triglycerides still elevated however LDL controlled.  Continue to follow with Dr. Ibanez    2) coronary artery calcification:  normal stress 08/2022. Asymptomatic. C/w clinical surveillance     3) hypertension: Blood pressure well controlled. C/w current regimen     Follow up in 1 year or sooner as needed. Reed asked if he could follow with me as my early morning appointment times are more conducive to his work schedule. I explained that I'd be " happy to see him & that Dr. Simeon & I collaborate very well & would be in communication on his case, if needed.    Tracy M Schwab, APRN-CNP

## 2025-02-10 ENCOUNTER — OFFICE VISIT (OUTPATIENT)
Dept: ORTHOPEDIC SURGERY | Facility: CLINIC | Age: 55
End: 2025-02-10
Payer: COMMERCIAL

## 2025-02-10 VITALS — WEIGHT: 215 LBS | BODY MASS INDEX: 30.78 KG/M2 | HEIGHT: 70 IN

## 2025-02-10 DIAGNOSIS — S62.522B DISPLACED FRACTURE OF DISTAL PHALANX OF LEFT THUMB, INITIAL ENCOUNTER FOR OPEN FRACTURE: Primary | ICD-10-CM

## 2025-02-10 PROCEDURE — 3008F BODY MASS INDEX DOCD: CPT | Performed by: STUDENT IN AN ORGANIZED HEALTH CARE EDUCATION/TRAINING PROGRAM

## 2025-02-10 PROCEDURE — 99213 OFFICE O/P EST LOW 20 MIN: CPT | Performed by: STUDENT IN AN ORGANIZED HEALTH CARE EDUCATION/TRAINING PROGRAM

## 2025-02-10 NOTE — PROGRESS NOTES
S/p left thumb irrigation debridement of open fracture and nailbed repair on 8/16/2024.  A few weeks ago developed sensitivity over his nail.  Presents for evaluation.    Physical Examination:  The patient appears to be their stated age, is in no apparent distress, and is oriented x3. The patients mood and affect are appropriate. The patients gait is normal. The examination of the limb in question was performed in comparison to the contralateral limb.    Well-healed laceration.  New nail growth present.  Remnant nail growing out.  No evidence of erythema warmth or hook deformity.  AIN, PIN, ulnar intact  SILT A/R/U/M  Hand wwp        Assessment:  6 months postop   Plan:   Evidence of new nail growth.  No erythema or signs of infection or hook nail deformity.  Discussed at length with patient. Continue activity as tolerated.  Weight-bear as tolerated.    Bianca Gaspar MD

## 2025-02-11 LAB
25(OH)D3+25(OH)D2 SERPL-MCNC: 28 NG/ML (ref 30–100)
ALBUMIN SERPL-MCNC: 4.8 G/DL (ref 3.6–5.1)
ALP SERPL-CCNC: 58 U/L (ref 35–144)
ALT SERPL-CCNC: 21 U/L (ref 9–46)
ANION GAP SERPL CALCULATED.4IONS-SCNC: 8 MMOL/L (CALC) (ref 7–17)
AST SERPL-CCNC: 21 U/L (ref 10–35)
BILIRUB SERPL-MCNC: 0.4 MG/DL (ref 0.2–1.2)
BUN SERPL-MCNC: 14 MG/DL (ref 7–25)
CALCIUM SERPL-MCNC: 9.4 MG/DL (ref 8.6–10.3)
CHLORIDE SERPL-SCNC: 105 MMOL/L (ref 98–110)
CO2 SERPL-SCNC: 28 MMOL/L (ref 20–32)
CREAT SERPL-MCNC: 1.11 MG/DL (ref 0.7–1.3)
EGFRCR SERPLBLD CKD-EPI 2021: 80 ML/MIN/1.73M2
EST. AVERAGE GLUCOSE BLD GHB EST-MCNC: 134 MG/DL
EST. AVERAGE GLUCOSE BLD GHB EST-SCNC: 7.4 MMOL/L
GLUCOSE SERPL-MCNC: 97 MG/DL (ref 65–99)
HBA1C MFR BLD: 6.3 % OF TOTAL HGB
POTASSIUM SERPL-SCNC: 4.7 MMOL/L (ref 3.5–5.3)
PROT SERPL-MCNC: 7.2 G/DL (ref 6.1–8.1)
SODIUM SERPL-SCNC: 141 MMOL/L (ref 135–146)
TSH SERPL-ACNC: 0.68 MIU/L (ref 0.4–4.5)

## 2025-02-24 ENCOUNTER — OFFICE VISIT (OUTPATIENT)
Dept: PRIMARY CARE | Facility: CLINIC | Age: 55
End: 2025-02-24
Payer: COMMERCIAL

## 2025-02-24 VITALS
HEIGHT: 70 IN | HEART RATE: 94 BPM | SYSTOLIC BLOOD PRESSURE: 132 MMHG | TEMPERATURE: 98.4 F | OXYGEN SATURATION: 94 % | BODY MASS INDEX: 30.85 KG/M2 | DIASTOLIC BLOOD PRESSURE: 77 MMHG

## 2025-02-24 DIAGNOSIS — M54.50 ACUTE BILATERAL LOW BACK PAIN WITHOUT SCIATICA: Primary | ICD-10-CM

## 2025-02-24 PROCEDURE — 3078F DIAST BP <80 MM HG: CPT | Performed by: FAMILY MEDICINE

## 2025-02-24 PROCEDURE — 3075F SYST BP GE 130 - 139MM HG: CPT | Performed by: FAMILY MEDICINE

## 2025-02-24 PROCEDURE — 99213 OFFICE O/P EST LOW 20 MIN: CPT | Performed by: FAMILY MEDICINE

## 2025-02-24 RX ORDER — CYCLOBENZAPRINE HCL 10 MG
10 TABLET ORAL 3 TIMES DAILY PRN
Qty: 21 TABLET | Refills: 0 | Status: SHIPPED | OUTPATIENT
Start: 2025-02-24

## 2025-02-24 RX ORDER — MELOXICAM 15 MG/1
15 TABLET ORAL DAILY
Qty: 30 TABLET | Refills: 0 | Status: SHIPPED | OUTPATIENT
Start: 2025-02-24 | End: 2025-03-15 | Stop reason: WASHOUT

## 2025-02-24 ASSESSMENT — ENCOUNTER SYMPTOMS
DYSURIA: 0
HEADACHES: 0
ABDOMINAL PAIN: 0
WEAKNESS: 0
WEIGHT LOSS: 0
BACK PAIN: 1
BOWEL INCONTINENCE: 0
PARESTHESIAS: 0
PERIANAL NUMBNESS: 0
FEVER: 0
TINGLING: 0
NUMBNESS: 0
LEG PAIN: 0
PARESIS: 0

## 2025-02-24 NOTE — LETTER
February 24, 2025     Patient: Phi Viera   YOB: 1970   Date of Visit: 2/24/2025       To Whom It May Concern:    Phi Viera was seen in my clinic on 2/24/2025 at 3:00 pm. Please excuse Phi for his absence from work on 2/24/2025 through 2/27/2025.  May return to work 2/28/2025.    If you have any questions or concerns, please don't hesitate to call.         Sincerely,         Stan Jensen, DO        CC: No Recipients

## 2025-02-24 NOTE — PROGRESS NOTES
"Subjective     Patient ID: Phi Viera is a 54 y.o. male who presents for Back Pain.    HPI     Patient states on Saturday 2/22/2025 (2 days ago), he had injured his back while moving a couch.    He has been putting on lidocaine patches and ibuprofen/Tylenol.   He states he \"locks up\"/\"gets stiff\" in his lower back and is unable to work.   He states bending down causes a radiating pain that stems from his lower back outwards.   He denies any pain that radiates without bending down.   Sitting for prolonged periods or sitting with a straightened back causes the patient pain.     Review of Systems    Cardiovascular: Patient denies any chest pain, shortness of breath with exertion, tachycardia, palpitations, orthopnea, or paroxysmal nocturnal dyspnea.  Respiratory: Patient denies any cough, shortness breath, or wheezing.  Gastrointestinal: Patient denies any nausea, vomiting, diarrhea, constipation, melena, hematochezia, or reflux symptoms  Skin: Denies any rashes or skin lesions  Neurology: Patient denies any new motor or sensory losses. Denies any numbness, tingling, weakness, and incoordination of the extremities. Patient also denies any tremor, seizures, or gait instability.  Endocrinology: Denies any polyuria, polydipsia, polyphagia, or heat/cold intolerance.  Psychiatric: He denies any depression, anxiety, or suicidal/homicidal ideation.    Objective   /77   Pulse 94   Temp 36.9 °C (98.4 °F)   Ht 1.778 m (5' 10\")   SpO2 94%   BMI 30.85 kg/m²     Physical Exam    General Appearance: Alert and cooperative, in no acute distress, well-developed/well-nourished overweight male.  Neck: Supple and without adenopathy or rigidity. There is no JVD at 90° and no carotid bruits are noted. There is no thyromegaly, thyroid tenderness, or palpable thyroid nodules.  Heart: Regular rate and rhythm without murmur or ectopy.  Lungs: Clear to auscultation bilaterally with good air exchange.    Skin: Good turgor, moist, " warm and without rashes or lesions. *** redness on back along area causing pain    Neurological exam: Alert and oriented ×3, no tremor, normal gait.  Extremities: No clubbing, cyanosis, or edema  Psychiatric: Appropriate mood and affect, good insight and judgment, no delusions or thought disorders, no suicidal or homicidal ideation    Musculoskeletal: *** back pain    Assessment/Plan     Acute bilateral low back pain without sciatica (Primary):  - cyclobenzaprine (Flexeril) 10 mg tablet; Take 1 tablet (10 mg) by mouth 3 times a day as needed for muscle spasms.  Dispense: 21 tablet; Refill: 0  - meloxicam (Mobic) 15 mg tablet; Take 1 tablet (15 mg) by mouth once daily.  Dispense: 30 tablet; Refill: 0      Follow up in one week if symptoms do not resolve.     Scribe Attestation  By signing my name below, IXochilt, Appleibdiamante   attest that this documentation has been prepared under the direction and in the presence of Stan Jensen DO.    Requested Prescriptions     Signed Prescriptions Disp Refills    cyclobenzaprine (Flexeril) 10 mg tablet 21 tablet 0     Sig: Take 1 tablet (10 mg) by mouth 3 times a day as needed for muscle spasms.    meloxicam (Mobic) 15 mg tablet 30 tablet 0     Sig: Take 1 tablet (15 mg) by mouth once daily.

## 2025-02-24 NOTE — PATIENT INSTRUCTIONS
Off work as discussed.    Take medications as prescribed.    Follow up in 1 week if not resolving.    It was a pleasure to see you today. Thank you for choosing us for your health care needs.    If you have lab or other testing completed and have not been informed of results within one week, please call the office for your results.    If you haven't done so, consider signing up for [facility], the [facility] personal health record. Ask the staff how you can get started.

## 2025-03-10 PROCEDURE — 99285 EMERGENCY DEPT VISIT HI MDM: CPT | Performed by: EMERGENCY MEDICINE

## 2025-03-10 ASSESSMENT — PAIN - FUNCTIONAL ASSESSMENT: PAIN_FUNCTIONAL_ASSESSMENT: 0-10

## 2025-03-10 ASSESSMENT — PAIN SCALES - GENERAL: PAINLEVEL_OUTOF10: 7

## 2025-03-10 ASSESSMENT — PAIN DESCRIPTION - LOCATION: LOCATION: PERINEUM

## 2025-03-10 ASSESSMENT — PAIN DESCRIPTION - PAIN TYPE: TYPE: ACUTE PAIN

## 2025-03-11 ENCOUNTER — ANESTHESIA (OUTPATIENT)
Dept: OPERATING ROOM | Facility: HOSPITAL | Age: 55
End: 2025-03-11
Payer: COMMERCIAL

## 2025-03-11 ENCOUNTER — HOSPITAL ENCOUNTER (INPATIENT)
Facility: HOSPITAL | Age: 55
LOS: 1 days | Discharge: HOME | End: 2025-03-11
Attending: EMERGENCY MEDICINE | Admitting: STUDENT IN AN ORGANIZED HEALTH CARE EDUCATION/TRAINING PROGRAM
Payer: COMMERCIAL

## 2025-03-11 ENCOUNTER — APPOINTMENT (OUTPATIENT)
Dept: PRIMARY CARE | Facility: CLINIC | Age: 55
End: 2025-03-11
Payer: COMMERCIAL

## 2025-03-11 ENCOUNTER — APPOINTMENT (OUTPATIENT)
Dept: RADIOLOGY | Facility: HOSPITAL | Age: 55
End: 2025-03-11
Payer: COMMERCIAL

## 2025-03-11 ENCOUNTER — ANESTHESIA EVENT (OUTPATIENT)
Dept: OPERATING ROOM | Facility: HOSPITAL | Age: 55
End: 2025-03-11
Payer: COMMERCIAL

## 2025-03-11 VITALS
DIASTOLIC BLOOD PRESSURE: 74 MMHG | TEMPERATURE: 97.5 F | WEIGHT: 215 LBS | HEART RATE: 88 BPM | RESPIRATION RATE: 23 BRPM | OXYGEN SATURATION: 93 % | BODY MASS INDEX: 30.78 KG/M2 | HEIGHT: 70 IN | SYSTOLIC BLOOD PRESSURE: 122 MMHG

## 2025-03-11 DIAGNOSIS — L02.215 PERINEAL ABSCESS: Primary | ICD-10-CM

## 2025-03-11 DIAGNOSIS — K61.0 PERIANAL ABSCESS: ICD-10-CM

## 2025-03-11 LAB
ALBUMIN SERPL BCP-MCNC: 4.4 G/DL (ref 3.4–5)
ALP SERPL-CCNC: 66 U/L (ref 33–120)
ALT SERPL W P-5'-P-CCNC: 20 U/L (ref 10–52)
ANION GAP SERPL CALC-SCNC: 15 MMOL/L (ref 10–20)
APPEARANCE UR: CLEAR
AST SERPL W P-5'-P-CCNC: 13 U/L (ref 9–39)
BASOPHILS # BLD AUTO: 0.05 X10*3/UL (ref 0–0.1)
BASOPHILS NFR BLD AUTO: 0.4 %
BILIRUB SERPL-MCNC: 0.6 MG/DL (ref 0–1.2)
BILIRUB UR STRIP.AUTO-MCNC: NEGATIVE MG/DL
BUN SERPL-MCNC: 12 MG/DL (ref 6–23)
CALCIUM SERPL-MCNC: 9.7 MG/DL (ref 8.6–10.3)
CHLORIDE SERPL-SCNC: 104 MMOL/L (ref 98–107)
CO2 SERPL-SCNC: 23 MMOL/L (ref 21–32)
COLOR UR: ABNORMAL
CREAT SERPL-MCNC: 0.97 MG/DL (ref 0.5–1.3)
EGFRCR SERPLBLD CKD-EPI 2021: >90 ML/MIN/1.73M*2
EOSINOPHIL # BLD AUTO: 0.2 X10*3/UL (ref 0–0.7)
EOSINOPHIL NFR BLD AUTO: 1.8 %
ERYTHROCYTE [DISTWIDTH] IN BLOOD BY AUTOMATED COUNT: 12 % (ref 11.5–14.5)
GLUCOSE BLD MANUAL STRIP-MCNC: 167 MG/DL (ref 74–99)
GLUCOSE BLD MANUAL STRIP-MCNC: 179 MG/DL (ref 74–99)
GLUCOSE BLD MANUAL STRIP-MCNC: 180 MG/DL (ref 74–99)
GLUCOSE SERPL-MCNC: 129 MG/DL (ref 74–99)
GLUCOSE UR STRIP.AUTO-MCNC: NORMAL MG/DL
HCT VFR BLD AUTO: 37.9 % (ref 41–52)
HGB BLD-MCNC: 13.2 G/DL (ref 13.5–17.5)
HOLD SPECIMEN: NORMAL
IMM GRANULOCYTES # BLD AUTO: 0.03 X10*3/UL (ref 0–0.7)
IMM GRANULOCYTES NFR BLD AUTO: 0.3 % (ref 0–0.9)
KETONES UR STRIP.AUTO-MCNC: NEGATIVE MG/DL
LACTATE SERPL-SCNC: 0.7 MMOL/L (ref 0.4–2)
LEUKOCYTE ESTERASE UR QL STRIP.AUTO: NEGATIVE
LYMPHOCYTES # BLD AUTO: 2.27 X10*3/UL (ref 1.2–4.8)
LYMPHOCYTES NFR BLD AUTO: 19.9 %
MAGNESIUM SERPL-MCNC: 1.94 MG/DL (ref 1.6–2.4)
MCH RBC QN AUTO: 32.2 PG (ref 26–34)
MCHC RBC AUTO-ENTMCNC: 34.8 G/DL (ref 32–36)
MCV RBC AUTO: 92 FL (ref 80–100)
MONOCYTES # BLD AUTO: 0.94 X10*3/UL (ref 0.1–1)
MONOCYTES NFR BLD AUTO: 8.2 %
NEUTROPHILS # BLD AUTO: 7.93 X10*3/UL (ref 1.2–7.7)
NEUTROPHILS NFR BLD AUTO: 69.4 %
NITRITE UR QL STRIP.AUTO: NEGATIVE
NRBC BLD-RTO: 0 /100 WBCS (ref 0–0)
PH UR STRIP.AUTO: 6 [PH]
PLATELET # BLD AUTO: 256 X10*3/UL (ref 150–450)
POTASSIUM SERPL-SCNC: 3.5 MMOL/L (ref 3.5–5.3)
PROT SERPL-MCNC: 7.2 G/DL (ref 6.4–8.2)
PROT UR STRIP.AUTO-MCNC: NEGATIVE MG/DL
RBC # BLD AUTO: 4.1 X10*6/UL (ref 4.5–5.9)
RBC # UR STRIP.AUTO: ABNORMAL MG/DL
RBC #/AREA URNS AUTO: NORMAL /HPF
SODIUM SERPL-SCNC: 138 MMOL/L (ref 136–145)
SP GR UR STRIP.AUTO: >1.05
UROBILINOGEN UR STRIP.AUTO-MCNC: NORMAL MG/DL
WBC # BLD AUTO: 11.4 X10*3/UL (ref 4.4–11.3)
WBC #/AREA URNS AUTO: NORMAL /HPF

## 2025-03-11 PROCEDURE — 36415 COLL VENOUS BLD VENIPUNCTURE: CPT

## 2025-03-11 PROCEDURE — 82947 ASSAY GLUCOSE BLOOD QUANT: CPT

## 2025-03-11 PROCEDURE — 96367 TX/PROPH/DG ADDL SEQ IV INF: CPT

## 2025-03-11 PROCEDURE — 3600000003 HC OR TIME - INITIAL BASE CHARGE - PROCEDURE LEVEL THREE: Performed by: STUDENT IN AN ORGANIZED HEALTH CARE EDUCATION/TRAINING PROGRAM

## 2025-03-11 PROCEDURE — 2500000004 HC RX 250 GENERAL PHARMACY W/ HCPCS (ALT 636 FOR OP/ED): Mod: JZ

## 2025-03-11 PROCEDURE — 74177 CT ABD & PELVIS W/CONTRAST: CPT

## 2025-03-11 PROCEDURE — 2060000001 HC INTERMEDIATE ICU ROOM DAILY

## 2025-03-11 PROCEDURE — 96376 TX/PRO/DX INJ SAME DRUG ADON: CPT

## 2025-03-11 PROCEDURE — 96365 THER/PROPH/DIAG IV INF INIT: CPT | Mod: 59

## 2025-03-11 PROCEDURE — 2780000003 HC OR 278 NO HCPCS: Performed by: STUDENT IN AN ORGANIZED HEALTH CARE EDUCATION/TRAINING PROGRAM

## 2025-03-11 PROCEDURE — 81001 URINALYSIS AUTO W/SCOPE: CPT

## 2025-03-11 PROCEDURE — 2500000005 HC RX 250 GENERAL PHARMACY W/O HCPCS: Performed by: STUDENT IN AN ORGANIZED HEALTH CARE EDUCATION/TRAINING PROGRAM

## 2025-03-11 PROCEDURE — 2500000005 HC RX 250 GENERAL PHARMACY W/O HCPCS: Performed by: ANESTHESIOLOGY

## 2025-03-11 PROCEDURE — 7100000002 HC RECOVERY ROOM TIME - EACH INCREMENTAL 1 MINUTE: Performed by: STUDENT IN AN ORGANIZED HEALTH CARE EDUCATION/TRAINING PROGRAM

## 2025-03-11 PROCEDURE — 87077 CULTURE AEROBIC IDENTIFY: CPT | Mod: STJLAB | Performed by: STUDENT IN AN ORGANIZED HEALTH CARE EDUCATION/TRAINING PROGRAM

## 2025-03-11 PROCEDURE — 2550000001 HC RX 255 CONTRASTS: Performed by: EMERGENCY MEDICINE

## 2025-03-11 PROCEDURE — 46050 I&D PERIANAL ABSCESS SUPFC: CPT | Performed by: STUDENT IN AN ORGANIZED HEALTH CARE EDUCATION/TRAINING PROGRAM

## 2025-03-11 PROCEDURE — 96375 TX/PRO/DX INJ NEW DRUG ADDON: CPT

## 2025-03-11 PROCEDURE — A46050 PR I AND D PERIANAL ABSCESS,SUPERFICIAL: Performed by: NURSE ANESTHETIST, CERTIFIED REGISTERED

## 2025-03-11 PROCEDURE — 2500000004 HC RX 250 GENERAL PHARMACY W/ HCPCS (ALT 636 FOR OP/ED): Performed by: NURSE ANESTHETIST, CERTIFIED REGISTERED

## 2025-03-11 PROCEDURE — 0D9Q0ZZ DRAINAGE OF ANUS, OPEN APPROACH: ICD-10-PCS | Performed by: STUDENT IN AN ORGANIZED HEALTH CARE EDUCATION/TRAINING PROGRAM

## 2025-03-11 PROCEDURE — 3700000002 HC GENERAL ANESTHESIA TIME - EACH INCREMENTAL 1 MINUTE: Performed by: STUDENT IN AN ORGANIZED HEALTH CARE EDUCATION/TRAINING PROGRAM

## 2025-03-11 PROCEDURE — 7100000001 HC RECOVERY ROOM TIME - INITIAL BASE CHARGE: Performed by: STUDENT IN AN ORGANIZED HEALTH CARE EDUCATION/TRAINING PROGRAM

## 2025-03-11 PROCEDURE — 3600000008 HC OR TIME - EACH INCREMENTAL 1 MINUTE - PROCEDURE LEVEL THREE: Performed by: STUDENT IN AN ORGANIZED HEALTH CARE EDUCATION/TRAINING PROGRAM

## 2025-03-11 PROCEDURE — 2500000002 HC RX 250 W HCPCS SELF ADMINISTERED DRUGS (ALT 637 FOR MEDICARE OP, ALT 636 FOR OP/ED): Performed by: NURSE PRACTITIONER

## 2025-03-11 PROCEDURE — 2500000004 HC RX 250 GENERAL PHARMACY W/ HCPCS (ALT 636 FOR OP/ED): Performed by: STUDENT IN AN ORGANIZED HEALTH CARE EDUCATION/TRAINING PROGRAM

## 2025-03-11 PROCEDURE — 3700000001 HC GENERAL ANESTHESIA TIME - INITIAL BASE CHARGE: Performed by: STUDENT IN AN ORGANIZED HEALTH CARE EDUCATION/TRAINING PROGRAM

## 2025-03-11 PROCEDURE — 85025 COMPLETE CBC W/AUTO DIFF WBC: CPT

## 2025-03-11 PROCEDURE — 99024 POSTOP FOLLOW-UP VISIT: CPT | Performed by: STUDENT IN AN ORGANIZED HEALTH CARE EDUCATION/TRAINING PROGRAM

## 2025-03-11 PROCEDURE — 87040 BLOOD CULTURE FOR BACTERIA: CPT | Mod: STJLAB

## 2025-03-11 PROCEDURE — 83735 ASSAY OF MAGNESIUM: CPT

## 2025-03-11 PROCEDURE — 80053 COMPREHEN METABOLIC PANEL: CPT

## 2025-03-11 PROCEDURE — A46050 PR I AND D PERIANAL ABSCESS,SUPERFICIAL: Performed by: ANESTHESIOLOGY

## 2025-03-11 PROCEDURE — 2500000004 HC RX 250 GENERAL PHARMACY W/ HCPCS (ALT 636 FOR OP/ED): Performed by: NURSE PRACTITIONER

## 2025-03-11 PROCEDURE — 83605 ASSAY OF LACTIC ACID: CPT

## 2025-03-11 PROCEDURE — 99222 1ST HOSP IP/OBS MODERATE 55: CPT | Performed by: NURSE PRACTITIONER

## 2025-03-11 RX ORDER — AMOXICILLIN AND CLAVULANATE POTASSIUM 875; 125 MG/1; MG/1
875 TABLET, FILM COATED ORAL 2 TIMES DAILY
Qty: 14 TABLET | Refills: 0 | Status: SHIPPED | OUTPATIENT
Start: 2025-03-11 | End: 2025-03-11

## 2025-03-11 RX ORDER — OXYCODONE AND ACETAMINOPHEN 5; 325 MG/1; MG/1
1 TABLET ORAL EVERY 6 HOURS PRN
Qty: 4 TABLET | Refills: 0 | Status: SHIPPED | OUTPATIENT
Start: 2025-03-11 | End: 2025-03-15 | Stop reason: WASHOUT

## 2025-03-11 RX ORDER — ONDANSETRON HYDROCHLORIDE 2 MG/ML
4 INJECTION, SOLUTION INTRAVENOUS EVERY 8 HOURS PRN
Status: DISCONTINUED | OUTPATIENT
Start: 2025-03-11 | End: 2025-03-11 | Stop reason: HOSPADM

## 2025-03-11 RX ORDER — ONDANSETRON HYDROCHLORIDE 2 MG/ML
INJECTION, SOLUTION INTRAVENOUS AS NEEDED
Status: DISCONTINUED | OUTPATIENT
Start: 2025-03-11 | End: 2025-03-11

## 2025-03-11 RX ORDER — MIDAZOLAM HYDROCHLORIDE 1 MG/ML
1 INJECTION, SOLUTION INTRAMUSCULAR; INTRAVENOUS ONCE AS NEEDED
Status: DISCONTINUED | OUTPATIENT
Start: 2025-03-11 | End: 2025-03-11 | Stop reason: HOSPADM

## 2025-03-11 RX ORDER — LAMOTRIGINE 25 MG/1
50 TABLET ORAL NIGHTLY
Status: CANCELLED | OUTPATIENT
Start: 2025-03-11

## 2025-03-11 RX ORDER — LIDOCAINE HYDROCHLORIDE 20 MG/ML
INJECTION, SOLUTION EPIDURAL; INFILTRATION; INTRACAUDAL; PERINEURAL AS NEEDED
Status: DISCONTINUED | OUTPATIENT
Start: 2025-03-11 | End: 2025-03-11

## 2025-03-11 RX ORDER — HYDROMORPHONE HYDROCHLORIDE 1 MG/ML
1 INJECTION, SOLUTION INTRAMUSCULAR; INTRAVENOUS; SUBCUTANEOUS EVERY 5 MIN PRN
Status: DISCONTINUED | OUTPATIENT
Start: 2025-03-11 | End: 2025-03-11 | Stop reason: HOSPADM

## 2025-03-11 RX ORDER — FENTANYL CITRATE 50 UG/ML
INJECTION, SOLUTION INTRAMUSCULAR; INTRAVENOUS AS NEEDED
Status: DISCONTINUED | OUTPATIENT
Start: 2025-03-11 | End: 2025-03-11

## 2025-03-11 RX ORDER — ONDANSETRON HYDROCHLORIDE 2 MG/ML
4 INJECTION, SOLUTION INTRAVENOUS ONCE AS NEEDED
Status: DISCONTINUED | OUTPATIENT
Start: 2025-03-11 | End: 2025-03-11 | Stop reason: HOSPADM

## 2025-03-11 RX ORDER — FLUTICASONE PROPIONATE 50 MCG
1 SPRAY, SUSPENSION (ML) NASAL DAILY
Status: CANCELLED | OUTPATIENT
Start: 2025-03-11

## 2025-03-11 RX ORDER — OXYCODONE HYDROCHLORIDE 10 MG/1
10 TABLET ORAL EVERY 4 HOURS PRN
Status: DISCONTINUED | OUTPATIENT
Start: 2025-03-11 | End: 2025-03-11 | Stop reason: HOSPADM

## 2025-03-11 RX ORDER — ONDANSETRON 4 MG/1
4 TABLET, FILM COATED ORAL EVERY 8 HOURS PRN
Status: DISCONTINUED | OUTPATIENT
Start: 2025-03-11 | End: 2025-03-11 | Stop reason: HOSPADM

## 2025-03-11 RX ORDER — OXYCODONE AND ACETAMINOPHEN 5; 325 MG/1; MG/1
1 TABLET ORAL EVERY 6 HOURS PRN
Qty: 4 TABLET | Refills: 0 | Status: SHIPPED | OUTPATIENT
Start: 2025-03-11 | End: 2025-03-11

## 2025-03-11 RX ORDER — AMOXICILLIN AND CLAVULANATE POTASSIUM 875; 125 MG/1; MG/1
875 TABLET, FILM COATED ORAL 2 TIMES DAILY
Qty: 14 TABLET | Refills: 0 | Status: SHIPPED | OUTPATIENT
Start: 2025-03-11 | End: 2025-03-18

## 2025-03-11 RX ORDER — POLYETHYLENE GLYCOL 3350 17 G/17G
17 POWDER, FOR SOLUTION ORAL DAILY
Status: DISCONTINUED | OUTPATIENT
Start: 2025-03-11 | End: 2025-03-11 | Stop reason: HOSPADM

## 2025-03-11 RX ORDER — DIPHENHYDRAMINE HYDROCHLORIDE 50 MG/ML
50 INJECTION, SOLUTION INTRAMUSCULAR; INTRAVENOUS ONCE
Status: COMPLETED | OUTPATIENT
Start: 2025-03-11 | End: 2025-03-11

## 2025-03-11 RX ORDER — PROPOFOL 10 MG/ML
INJECTION, EMULSION INTRAVENOUS AS NEEDED
Status: DISCONTINUED | OUTPATIENT
Start: 2025-03-11 | End: 2025-03-11

## 2025-03-11 RX ORDER — IBUPROFEN 600 MG/1
600 TABLET ORAL EVERY 6 HOURS PRN
Status: DISCONTINUED | OUTPATIENT
Start: 2025-03-11 | End: 2025-03-11 | Stop reason: HOSPADM

## 2025-03-11 RX ORDER — AMOXICILLIN 250 MG
2 CAPSULE ORAL 2 TIMES DAILY
Status: DISCONTINUED | OUTPATIENT
Start: 2025-03-11 | End: 2025-03-11 | Stop reason: HOSPADM

## 2025-03-11 RX ORDER — BUPIVACAINE HCL/EPINEPHRINE 0.5-1:200K
VIAL (ML) INJECTION AS NEEDED
Status: DISCONTINUED | OUTPATIENT
Start: 2025-03-11 | End: 2025-03-11 | Stop reason: HOSPADM

## 2025-03-11 RX ORDER — ALBUTEROL SULFATE 0.83 MG/ML
2.5 SOLUTION RESPIRATORY (INHALATION) ONCE AS NEEDED
Status: DISCONTINUED | OUTPATIENT
Start: 2025-03-11 | End: 2025-03-11 | Stop reason: HOSPADM

## 2025-03-11 RX ORDER — SODIUM CHLORIDE, SODIUM LACTATE, POTASSIUM CHLORIDE, CALCIUM CHLORIDE 600; 310; 30; 20 MG/100ML; MG/100ML; MG/100ML; MG/100ML
125 INJECTION, SOLUTION INTRAVENOUS CONTINUOUS
Status: DISCONTINUED | OUTPATIENT
Start: 2025-03-11 | End: 2025-03-11 | Stop reason: HOSPADM

## 2025-03-11 RX ORDER — ACETAMINOPHEN 325 MG/1
975 TABLET ORAL ONCE
Status: DISCONTINUED | OUTPATIENT
Start: 2025-03-11 | End: 2025-03-11 | Stop reason: HOSPADM

## 2025-03-11 RX ORDER — HYDROMORPHONE HYDROCHLORIDE 0.2 MG/ML
0.1 INJECTION INTRAMUSCULAR; INTRAVENOUS; SUBCUTANEOUS EVERY 5 MIN PRN
Status: DISCONTINUED | OUTPATIENT
Start: 2025-03-11 | End: 2025-03-11 | Stop reason: HOSPADM

## 2025-03-11 RX ORDER — HYDRALAZINE HYDROCHLORIDE 20 MG/ML
10 INJECTION INTRAMUSCULAR; INTRAVENOUS EVERY 30 MIN PRN
Status: DISCONTINUED | OUTPATIENT
Start: 2025-03-11 | End: 2025-03-11 | Stop reason: HOSPADM

## 2025-03-11 RX ORDER — OXYCODONE AND ACETAMINOPHEN 5; 325 MG/1; MG/1
1 TABLET ORAL EVERY 4 HOURS PRN
Status: DISCONTINUED | OUTPATIENT
Start: 2025-03-11 | End: 2025-03-11 | Stop reason: HOSPADM

## 2025-03-11 RX ORDER — INSULIN LISPRO 100 [IU]/ML
0-5 INJECTION, SOLUTION INTRAVENOUS; SUBCUTANEOUS
Status: DISCONTINUED | OUTPATIENT
Start: 2025-03-11 | End: 2025-03-11 | Stop reason: HOSPADM

## 2025-03-11 RX ORDER — MIDAZOLAM HYDROCHLORIDE 1 MG/ML
INJECTION, SOLUTION INTRAMUSCULAR; INTRAVENOUS AS NEEDED
Status: DISCONTINUED | OUTPATIENT
Start: 2025-03-11 | End: 2025-03-11

## 2025-03-11 RX ORDER — OXYCODONE HYDROCHLORIDE 5 MG/1
5 TABLET ORAL EVERY 4 HOURS PRN
Status: DISCONTINUED | OUTPATIENT
Start: 2025-03-11 | End: 2025-03-11 | Stop reason: HOSPADM

## 2025-03-11 RX ORDER — METOPROLOL TARTRATE 1 MG/ML
2.5 INJECTION, SOLUTION INTRAVENOUS EVERY 30 MIN PRN
Status: DISCONTINUED | OUTPATIENT
Start: 2025-03-11 | End: 2025-03-11 | Stop reason: HOSPADM

## 2025-03-11 RX ORDER — FLUOXETINE HYDROCHLORIDE 20 MG/1
20 CAPSULE ORAL EVERY MORNING
Status: CANCELLED | OUTPATIENT
Start: 2025-03-11

## 2025-03-11 RX ORDER — ACETAMINOPHEN 325 MG/1
650 TABLET ORAL EVERY 4 HOURS PRN
Status: DISCONTINUED | OUTPATIENT
Start: 2025-03-11 | End: 2025-03-11 | Stop reason: HOSPADM

## 2025-03-11 RX ORDER — PANTOPRAZOLE SODIUM 40 MG/1
40 TABLET, DELAYED RELEASE ORAL
Status: CANCELLED | OUTPATIENT
Start: 2025-03-12

## 2025-03-11 RX ORDER — ICOSAPENT ETHYL 1 G/1
2 CAPSULE ORAL 2 TIMES DAILY
Status: CANCELLED | OUTPATIENT
Start: 2025-03-11

## 2025-03-11 RX ORDER — KETOROLAC TROMETHAMINE 30 MG/ML
INJECTION, SOLUTION INTRAMUSCULAR; INTRAVENOUS AS NEEDED
Status: DISCONTINUED | OUTPATIENT
Start: 2025-03-11 | End: 2025-03-11

## 2025-03-11 RX ADMIN — DIPHENHYDRAMINE HYDROCHLORIDE 50 MG: 50 INJECTION, SOLUTION INTRAMUSCULAR; INTRAVENOUS at 06:36

## 2025-03-11 RX ADMIN — ONDANSETRON 4 MG: 2 INJECTION, SOLUTION INTRAMUSCULAR; INTRAVENOUS at 17:30

## 2025-03-11 RX ADMIN — METHYLPREDNISOLONE SODIUM SUCCINATE 40 MG: 40 INJECTION, POWDER, FOR SOLUTION INTRAMUSCULAR; INTRAVENOUS at 03:38

## 2025-03-11 RX ADMIN — Medication 5 L/MIN: at 18:04

## 2025-03-11 RX ADMIN — LIDOCAINE HYDROCHLORIDE 60 MG: 20 INJECTION, SOLUTION EPIDURAL; INFILTRATION; INTRACAUDAL; PERINEURAL at 17:20

## 2025-03-11 RX ADMIN — SODIUM CHLORIDE: 9 INJECTION, SOLUTION INTRAVENOUS at 17:12

## 2025-03-11 RX ADMIN — INSULIN LISPRO 1 UNITS: 100 INJECTION, SOLUTION INTRAVENOUS; SUBCUTANEOUS at 13:23

## 2025-03-11 RX ADMIN — PIPERACILLIN SODIUM AND TAZOBACTAM SODIUM 3.38 G: 3; .375 INJECTION, SOLUTION INTRAVENOUS at 16:02

## 2025-03-11 RX ADMIN — METHYLPREDNISOLONE SODIUM SUCCINATE 40 MG: 40 INJECTION, POWDER, FOR SOLUTION INTRAMUSCULAR; INTRAVENOUS at 13:23

## 2025-03-11 RX ADMIN — MIDAZOLAM 2 MG: 1 INJECTION INTRAMUSCULAR; INTRAVENOUS at 17:12

## 2025-03-11 RX ADMIN — IOHEXOL 75 ML: 350 INJECTION, SOLUTION INTRAVENOUS at 07:57

## 2025-03-11 RX ADMIN — PROPOFOL 200 MG: 10 INJECTION, EMULSION INTRAVENOUS at 17:19

## 2025-03-11 RX ADMIN — METHYLPREDNISOLONE SODIUM SUCCINATE 40 MG: 40 INJECTION, POWDER, FOR SOLUTION INTRAMUSCULAR; INTRAVENOUS at 08:42

## 2025-03-11 RX ADMIN — PIPERACILLIN SODIUM AND TAZOBACTAM SODIUM 3.38 G: 3; .375 INJECTION, SOLUTION INTRAVENOUS at 09:43

## 2025-03-11 RX ADMIN — KETOROLAC TROMETHAMINE 30 MG: 30 INJECTION, SOLUTION INTRAMUSCULAR; INTRAVENOUS at 17:37

## 2025-03-11 RX ADMIN — FENTANYL CITRATE 50 MCG: 50 INJECTION, SOLUTION INTRAMUSCULAR; INTRAVENOUS at 17:20

## 2025-03-11 RX ADMIN — VANCOMYCIN HYDROCHLORIDE 1500 MG: 1.5 INJECTION, POWDER, LYOPHILIZED, FOR SOLUTION INTRAVENOUS at 10:18

## 2025-03-11 RX ADMIN — FENTANYL CITRATE 50 MCG: 50 INJECTION, SOLUTION INTRAMUSCULAR; INTRAVENOUS at 17:36

## 2025-03-11 SDOH — SOCIAL STABILITY: SOCIAL INSECURITY
WITHIN THE LAST YEAR, HAVE YOU BEEN KICKED, HIT, SLAPPED, OR OTHERWISE PHYSICALLY HURT BY YOUR PARTNER OR EX-PARTNER?: NO

## 2025-03-11 SDOH — SOCIAL STABILITY: SOCIAL INSECURITY: DO YOU FEEL UNSAFE GOING BACK TO THE PLACE WHERE YOU ARE LIVING?: NO

## 2025-03-11 SDOH — ECONOMIC STABILITY: FOOD INSECURITY: WITHIN THE PAST 12 MONTHS, YOU WORRIED THAT YOUR FOOD WOULD RUN OUT BEFORE YOU GOT THE MONEY TO BUY MORE.: NEVER TRUE

## 2025-03-11 SDOH — SOCIAL STABILITY: SOCIAL INSECURITY: HAS ANYONE EVER THREATENED TO HURT YOUR FAMILY OR YOUR PETS?: NO

## 2025-03-11 SDOH — SOCIAL STABILITY: SOCIAL INSECURITY
WITHIN THE LAST YEAR, HAVE YOU BEEN RAPED OR FORCED TO HAVE ANY KIND OF SEXUAL ACTIVITY BY YOUR PARTNER OR EX-PARTNER?: NO

## 2025-03-11 SDOH — ECONOMIC STABILITY: HOUSING INSECURITY: IN THE PAST 12 MONTHS, HOW MANY TIMES HAVE YOU MOVED WHERE YOU WERE LIVING?: 0

## 2025-03-11 SDOH — SOCIAL STABILITY: SOCIAL INSECURITY: WITHIN THE LAST YEAR, HAVE YOU BEEN HUMILIATED OR EMOTIONALLY ABUSED IN OTHER WAYS BY YOUR PARTNER OR EX-PARTNER?: NO

## 2025-03-11 SDOH — ECONOMIC STABILITY: HOUSING INSECURITY: IN THE LAST 12 MONTHS, WAS THERE A TIME WHEN YOU WERE NOT ABLE TO PAY THE MORTGAGE OR RENT ON TIME?: NO

## 2025-03-11 SDOH — ECONOMIC STABILITY: INCOME INSECURITY: IN THE PAST 12 MONTHS HAS THE ELECTRIC, GAS, OIL, OR WATER COMPANY THREATENED TO SHUT OFF SERVICES IN YOUR HOME?: NO

## 2025-03-11 SDOH — SOCIAL STABILITY: SOCIAL INSECURITY: WITHIN THE LAST YEAR, HAVE YOU BEEN AFRAID OF YOUR PARTNER OR EX-PARTNER?: NO

## 2025-03-11 SDOH — ECONOMIC STABILITY: HOUSING INSECURITY: AT ANY TIME IN THE PAST 12 MONTHS, WERE YOU HOMELESS OR LIVING IN A SHELTER (INCLUDING NOW)?: NO

## 2025-03-11 SDOH — SOCIAL STABILITY: SOCIAL INSECURITY: WERE YOU ABLE TO COMPLETE ALL THE BEHAVIORAL HEALTH SCREENINGS?: YES

## 2025-03-11 SDOH — HEALTH STABILITY: MENTAL HEALTH: CURRENT SMOKER: 1

## 2025-03-11 SDOH — ECONOMIC STABILITY: FOOD INSECURITY: HOW HARD IS IT FOR YOU TO PAY FOR THE VERY BASICS LIKE FOOD, HOUSING, MEDICAL CARE, AND HEATING?: NOT HARD AT ALL

## 2025-03-11 SDOH — SOCIAL STABILITY: SOCIAL INSECURITY: DO YOU FEEL ANYONE HAS EXPLOITED OR TAKEN ADVANTAGE OF YOU FINANCIALLY OR OF YOUR PERSONAL PROPERTY?: NO

## 2025-03-11 SDOH — ECONOMIC STABILITY: FOOD INSECURITY: WITHIN THE PAST 12 MONTHS, THE FOOD YOU BOUGHT JUST DIDN'T LAST AND YOU DIDN'T HAVE MONEY TO GET MORE.: NEVER TRUE

## 2025-03-11 SDOH — SOCIAL STABILITY: SOCIAL INSECURITY: DOES ANYONE TRY TO KEEP YOU FROM HAVING/CONTACTING OTHER FRIENDS OR DOING THINGS OUTSIDE YOUR HOME?: NO

## 2025-03-11 SDOH — SOCIAL STABILITY: SOCIAL INSECURITY: ARE YOU OR HAVE YOU BEEN THREATENED OR ABUSED PHYSICALLY, EMOTIONALLY, OR SEXUALLY BY ANYONE?: NO

## 2025-03-11 SDOH — SOCIAL STABILITY: SOCIAL INSECURITY: ABUSE: ADULT

## 2025-03-11 SDOH — SOCIAL STABILITY: SOCIAL INSECURITY: ARE THERE ANY APPARENT SIGNS OF INJURIES/BEHAVIORS THAT COULD BE RELATED TO ABUSE/NEGLECT?: NO

## 2025-03-11 SDOH — SOCIAL STABILITY: SOCIAL INSECURITY: HAVE YOU HAD THOUGHTS OF HARMING ANYONE ELSE?: NO

## 2025-03-11 SDOH — ECONOMIC STABILITY: TRANSPORTATION INSECURITY: IN THE PAST 12 MONTHS, HAS LACK OF TRANSPORTATION KEPT YOU FROM MEDICAL APPOINTMENTS OR FROM GETTING MEDICATIONS?: NO

## 2025-03-11 ASSESSMENT — LIFESTYLE VARIABLES
AUDIT-C TOTAL SCORE: 0
HOW MANY STANDARD DRINKS CONTAINING ALCOHOL DO YOU HAVE ON A TYPICAL DAY: PATIENT DOES NOT DRINK
SKIP TO QUESTIONS 9-10: 1
HOW OFTEN DO YOU HAVE 6 OR MORE DRINKS ON ONE OCCASION: NEVER
HOW OFTEN DO YOU HAVE A DRINK CONTAINING ALCOHOL: NEVER
AUDIT-C TOTAL SCORE: 0

## 2025-03-11 ASSESSMENT — ACTIVITIES OF DAILY LIVING (ADL)
ADEQUATE_TO_COMPLETE_ADL: YES
WALKS IN HOME: INDEPENDENT
HEARING - LEFT EAR: FUNCTIONAL
LACK_OF_TRANSPORTATION: NO
PATIENT'S MEMORY ADEQUATE TO SAFELY COMPLETE DAILY ACTIVITIES?: YES
TOILETING: INDEPENDENT
DRESSING YOURSELF: INDEPENDENT
FEEDING YOURSELF: INDEPENDENT
JUDGMENT_ADEQUATE_SAFELY_COMPLETE_DAILY_ACTIVITIES: YES
HEARING - RIGHT EAR: FUNCTIONAL
LACK_OF_TRANSPORTATION: PATIENT DECLINED
GROOMING: INDEPENDENT
BATHING: INDEPENDENT

## 2025-03-11 ASSESSMENT — COGNITIVE AND FUNCTIONAL STATUS - GENERAL
DAILY ACTIVITIY SCORE: 24
PATIENT BASELINE BEDBOUND: NO
MOBILITY SCORE: 24

## 2025-03-11 ASSESSMENT — PAIN SCALES - GENERAL
PAINLEVEL_OUTOF10: 0 - NO PAIN
PAINLEVEL_OUTOF10: 0 - NO PAIN
PAIN_LEVEL: 2
PAINLEVEL_OUTOF10: 0 - NO PAIN
PAINLEVEL_OUTOF10: 1
PAINLEVEL_OUTOF10: 0 - NO PAIN

## 2025-03-11 ASSESSMENT — ENCOUNTER SYMPTOMS
RESPIRATORY NEGATIVE: 1
CARDIOVASCULAR NEGATIVE: 1
MUSCULOSKELETAL NEGATIVE: 1
ENDOCRINE NEGATIVE: 1
CHILLS: 1
WOUND: 1
RECTAL PAIN: 1
FEVER: 1

## 2025-03-11 ASSESSMENT — PATIENT HEALTH QUESTIONNAIRE - PHQ9
1. LITTLE INTEREST OR PLEASURE IN DOING THINGS: NOT AT ALL
SUM OF ALL RESPONSES TO PHQ9 QUESTIONS 1 & 2: 0
2. FEELING DOWN, DEPRESSED OR HOPELESS: NOT AT ALL

## 2025-03-11 ASSESSMENT — PAIN - FUNCTIONAL ASSESSMENT
PAIN_FUNCTIONAL_ASSESSMENT: 0-10

## 2025-03-11 NOTE — ED PROVIDER NOTES
Emergency Department Provider Note        History of Present Illness     History provided by: Patient  Limitations to History: None  External Records Reviewed with Brief Summary: None    HPI:  Phi Viera is a 55 y.o. male 1PPD smoker with history of pilonidal cyst, DM2, HLD, and HTN, who presents to the ED with complaint of abscess.  Patient said that his symptoms started on Saturday.  Stated that he usually gets a small pimple in the perineal region that goes away spontaneously.  Stated that he noticed a small pimple in the perineal region on Saturday, which has progressively got worse.  Patient stated that he is a  and sitting for too long causes pain.  Stated that his pain is variable.  Pain is 1/10 at rest.  Pain can get anywhere from 4-10/10 with changing position.  Patient reports associated chills, and sweating.  Denies fever, testicular pain, dysuria, abdominal pain, nausea, vomiting,  headaches, dizziness, or lightheadedness.    Physical Exam   Triage vitals:  T 36.2 °C (97.2 °F)  HR (!) 115  /85  RR 20  O2 96 % None (Room air)    General: Awake, alert, in no acute distress  Eyes: Gaze conjugate.  No scleral icterus or injection  HENT: Normo-cephalic, atraumatic. No stridor  CV: Tachycardic rate, regular rhythm. Radial pulses 2+ bilaterally  Resp: Breathing non-labored, speaking in full sentences.  Clear to auscultation bilaterally  GI: Soft, non-distended, non-tender. No rebound or guarding.  : There is perianal swelling below the left testicle with fluctuant mass.  MSK/Extremities: No gross bony deformities. Moving all extremities  Skin: Warm. Appropriate color  Neuro: Alert. Oriented. Face symmetric. Speech is fluent.  Gross strength and sensation intact in b/l UE and LEs  Psych: Appropriate mood and affect    Medical Decision Making & ED Course   Medical Decision Makin y.o. male  1PPD smoker with history of pilonidal cyst, DM2, HLD, and HTN, who presents to the ED with  complaint of abscess. On arrival to the ED, the patient was stable, A&Ox3, non-toxic, well-appearing, and in no acute distress. Primary assessment is evident for intact ABC. The patient was able to give account of current event and verbalize presenting symptoms.    My personal assessment is directed towards ordering labs including CBC, CMP, urinalysis, lactate, blood culture to rule out anemia, electrolyte abnormality, and UTI.  Will order CT of the pelvis with IV contrast to evaluate for perineal abscess.  Given the patient history of contrast allergy, rad ops was consulted, Dr. Gallardo recommended 4 hours.  Prior to CT scan.    CBC is notable for leukocytosis with WBC of 11.4.  Chemistry panel is unremarkable.  Magnesium is normal.    The case was discussed with the ED attending, Dr. Thibodeaux, who saw and evaluated patient at the bedside.    CT of the pelvis with perineal region is pending.  The patient was signed out to the morning shift resident, Dr. Woo.  ----  Differential diagnoses considered include but are not limited to: Perineal abscess, or rectoperineal fistula     Social Determinants of Health which Significantly Impact Care: None identified     EKG Independent Interpretation: EKG not obtained    Independent Result Review and Interpretation: Relevant laboratory and radiographic results were reviewed and independently interpreted by myself.  As necessary, they are commented on in the ED Course.    Chronic conditions affecting the patient's care: As documented above in [facility]    The patient was discussed with the following consultants/services: None    Care Considerations: As documented above in [facility]    ED Course:  ED Course as of 03/12/25 1101   Tue Mar 11, 2025   0947 Patient's abscess began to drain spontaneously [FN]   1001 Consulted on-call general surgery, Dr. Ambrosio.  Do not see need for obtaining follow-up MRI from CT finding.  He reviewed imaging, and was not concerned significantly for fistula  formation.  Recommended I&D.  He is sending his surgery NP for I&D. [MI]   1030 Surgery NP evaluated patient in person, and staffed with Dr. Ambrosio.  Recommended IV LR, due to concern of significant fistula formation, induration behind the scrotum. [MI]      ED Course User Index  [FN] Val Howe MD  [MI] Priscilla Woo MD         Diagnoses as of 03/12/25 1101   Perineal abscess     Disposition   Dispo is pending complete workup.  The patient was signed out to the morning shift resident, Dr. Woo.  At time of signout, CT scanning was pending.    Procedures   Procedures    This was a shared visit with an ED attending.  The patient was seen and discussed with the ED attending Dr. Thibodeaux.    Eran Romeo MD  Emergency Medicine, PGY-2     Eran Romeo MD  Resident  03/11/25 0803       Eran Romeo MD  Resident  03/12/25 0014       Eran Romeo MD  Resident  03/12/25 1101

## 2025-03-11 NOTE — PROGRESS NOTES
Emergency Medicine Transition of Care Note.    I received Phi Viera in signout from Dr. Thibodeaux.  Please see the previous ED provider note for all HPI, PE and MDM up to the time of signout at 0700. This is in addition to the primary record.    In brief Phi Viera is an 55 y.o. male presenting for   Chief Complaint   Patient presents with    Abscess     Left buttock, patient states he feels like it is getting larger and going towards his rectum and left testicle.      At the time of signout we were awaiting: CT imaging of the abdomen pelvis for further disposition and assessment of perineal infection.    ED Course as of 03/12/25 0626 Tue Mar 11, 2025   0947 Patient's abscess began to drain spontaneously [FN]   1001 Consulted on-call general surgery, Dr. Ambrosio.  Do not see need for obtaining follow-up MRI from CT finding.  He reviewed imaging, and was not concerned significantly for fistula formation.  Recommended I&D.  He is sending his surgery NP for I&D. [MI]   1030 Surgery NP evaluated patient in person, and staffed with Dr. Ambrosio.  Recommended IV LR, due to concern of significant fistula formation, induration behind the scrotum. [MI]      ED Course User Index  [FN] Val Howe MD  [MI] Priscilla Woo MD         Diagnoses as of 03/12/25 0626   Perineal abscess       Medical Decision Making      Final diagnoses:   None           Procedure  Procedures    Priscilla Woo MD

## 2025-03-11 NOTE — ANESTHESIA POSTPROCEDURE EVALUATION
Patient: Phi Viera    Procedure Summary       Date: 03/11/25 Room / Location: [facility] OR 05 / Virtual [facility] OR    Anesthesia Start: 1712 Anesthesia Stop: 1756    Procedure: INCISION AND DRAINAGE, ABSCESS, ANORECTAL Diagnosis:       Perianal abscess      (Perineal abscess [L02.215])    Surgeons: James Ambrosio MD Responsible Provider: Jody Russo MD    Anesthesia Type: general ASA Status: 3            Anesthesia Type: general    Vitals Value Taken Time   /55 03/11/25 1834   Temp 36.1 °C (97 °F) 03/11/25 1755   Pulse 86 03/11/25 1839   Resp 20 03/11/25 1839   SpO2 92 % 03/11/25 1839   Vitals shown include unfiled device data.    Anesthesia Post Evaluation    Patient location during evaluation: PACU  Patient participation: complete - patient participated  Level of consciousness: sleepy but conscious, responsive to verbal stimuli, responsive to light touch and responsive to physical stimuli  Pain score: 2  Pain management: satisfactory to patient  Multimodal analgesia pain management approach  Airway patency: patent  Two or more strategies used to mitigate risk of obstructive sleep apnea  Cardiovascular status: acceptable, hemodynamically stable and stable  Respiratory status: acceptable, unassisted, spontaneous ventilation, nonlabored ventilation and nasal cannula  Hydration status: balanced  Postoperative Nausea and Vomiting: none        There were no known notable events for this encounter.

## 2025-03-11 NOTE — OP NOTE
INCISION AND DRAINAGE, ABSCESS, ANORECTAL Operative Note     Date: 3/11/2025  OR Location: [facility] OR    Name: Phi Viera, : 1970, Age: 55 y.o., MRN: [id number], Sex: male    Diagnosis  Pre-op Diagnosis      * Perineal abscess [L02.215] Post-op Diagnosis     * Perineal abscess [L02.215]     Procedures  INCISION AND DRAINAGE, ABSCESS, ANORECTAL  [id number] - UT I AND D PERIANAL ABSCESS,SUPERFICIAL      Surgeons      * James Ambrosio - Primary    Resident/Fellow/Other Assistant:  Surgeons and Role:  * No surgeons found with a matching role *    Staff:   Surgical Assistant: Hamzah Chiuub Person: Ashleigh  Circulator: Garry Aguilera Person: Shaye    Anesthesia Staff: Anesthesiologist: Jody Russo MD  C-AA: SASHA Shea    Procedure Summary  Anesthesia: General  ASA: III  Estimated Blood Loss: 5mL  Intra-op Medications:   Administrations occurring from 1600 to 1710 on 25:   Medication Name Total Dose   acetaminophen (Tylenol) tablet 650 mg Cannot be calculated   insulin lispro injection 0-5 Units Cannot be calculated   oxyCODONE (Roxicodone) immediate release tablet 10 mg Cannot be calculated   oxyCODONE (Roxicodone) immediate release tablet 5 mg Cannot be calculated   piperacillin-tazobactam (Zosyn) 3.375 g in dextrose (iso) IV 50 mL 3.375 g   polyethylene glycol (Glycolax, Miralax) packet 17 g Cannot be calculated   sennosides-docusate sodium (Soledad-Colace) 8.6-50 mg per tablet 2 tablet Cannot be calculated              Anesthesia Record               Intraprocedure I/O Totals       None           Specimen:   ID Type Source Tests Collected by Time   A : ABCESS Swab ABSCESS TISSUE/WOUND CULTURE/SMEAR James Ambrosio MD 3/11/2025 5017                 Drains and/or Catheters: * None in log *    Tourniquet Times:         Implants:     Findings: Left anterior perianal abscess, large cavity extending towards left base of scrotum.  Penrose drain inserted across cavity margins.    Indications: Phi Viera  is an 55 y.o. male who is having surgery for Perineal abscess [L02.215].     The patient was seen in the preoperative area. The risks, benefits, complications, treatment options, non-operative alternatives, expected recovery and outcomes were discussed with the patient. The possibilities of reaction to medication, pulmonary aspiration, injury to surrounding structures, bleeding, recurrent infection, the need for additional procedures, failure to diagnose a condition, and creating a complication requiring transfusion or operation were discussed with the patient. The patient concurred with the proposed plan, giving informed consent.  The site of surgery was properly noted/marked if necessary per policy. The patient has been actively warmed in preoperative area. Preoperative antibiotics have been ordered and given within 2 hours of incision. Venous thrombosis prophylaxis have been ordered including bilateral sequential compression devices    Procedure Details: Safety checklist was performed in the preoperative area.  The patient was brought to the operating room.  Sequential compression devices were applied to bilateral lower extremities.  Following induction of general anesthesia, the patient was placed in lithotomy position using the yellow-fin stirrups.  The patient's arms were abducted and gently fixed to arm boards.  The perianal and perineal regions were prepped with betadine solution and the patient draped in the usual sterile fashion.  A time out was performed, once again confirming correct patient and correct procedure.      Following completion of perioperative antibiotics, a perianal exam was performed.  Patient was noted to have a large left anterior perianal abscess that was already spontaneously drainage purulent material.  There was surrounding erythema and induration.  The #15 scalpel blade was used to incise the abscess cavity at the site of active drainage.  A portion of skin was excised from this  area to facilitate adequate drainage.  A culture swab of the drainage was obtained.  A curved hemostat was then used to break up the underlying loculations and delineate the borders of the abscess cavity.  The cavity extended towards the base of the left scrotum.  Decision was made to create a counter-incision at the distant extent from site of drainage.  This was performed using electrocautery.  A 1/4 inch penrose drain was then inserted across the two points, looped and stitched upon itself using 0 silk stitch.  The abscess cavity was then irrigated with normal saline.  A well-lubricated digital rectal exam was performed and there were no palpable masses or blood on exam.  A surgical count was performed and confirmed to be correct.  Perianal analgesia was provided with 0.5% marcaine with epinephrine.  Skin was now cleansed with normal saline.  Dry dressings were applied.  A sign out was performed.  The patient was awakened and taken to the recovery area in stable condition.    Complications:  None; patient tolerated the procedure well.    Disposition: PACU - hemodynamically stable.  Condition: stable         Task Performed by RNFA or Surgical Assistant:  Patient positioning, skin preparation, intraoperative assistance.      Additional Details: N/A    Attending Attestation: I was present and scrubbed for the entire procedure.    James Ambrosio  Phone Number: [phone]

## 2025-03-11 NOTE — ANESTHESIA PREPROCEDURE EVALUATION
Patient: Phi Viera    Procedure Information       Date/Time: 03/11/25 1600    Procedure: INCISION AND DRAINAGE, ABSCESS, ANORECTAL    Location: [facility] OR 05 / [facility]    Surgeons: James Ambrosio MD            Relevant Problems   Cardiac   (+) Benign essential hypertension   (+) Hyperlipidemia      Pulmonary   (+) EMI (obstructive sleep apnea)      Neuro   (+) Anxiety   (+) Depressive disorder   (+) Recurrent major depressive disorder (CMS-HCC)      GI   (+) Gastroesophageal reflux disease      Liver   (+) Fatty liver      Endocrine   (+) Obesity      HEENT   (+) Acute sinusitis       Clinical information reviewed:   Tobacco  Allergies  Meds  Problems  Med Hx  Surg Hx   Fam Hx  Soc   Hx        NPO Detail:  No data recorded     Physical Exam    Airway  Mallampati: II  TM distance: >3 FB  Neck ROM: full     Cardiovascular - normal exam  Rhythm: regular  Rate: normal     Dental   (+) implants     Pulmonary - normal exam  Breath sounds clear to auscultation     Abdominal   (+) obese  Abdomen: soft  Bowel sounds: normal           Anesthesia Plan    History of general anesthesia?: yes  History of complications of general anesthesia?: no    ASA 3     general     The patient is a current smoker.  Patient was previously instructed to abstain from smoking on day of procedure.  Patient did not smoke on day of procedure.  Education provided regarding risk of obstructive sleep apnea.  intravenous induction   Postoperative administration of opioids is intended.  Anesthetic plan and risks discussed with patient and spouse.  Use of blood products discussed with patient and spouse who consented to blood products.    Plan discussed with CAA.

## 2025-03-11 NOTE — DISCHARGE INSTRUCTIONS
WOUND CARE:  Recommend wearing panty-liner or applying gauze between buttocks to keep undergarments clean; change at least daily.  Do not remove the drain.    PAIN CONTROL:  Can utilize prescribed percocet as needed.  Do not combine percocet with acetaminophen (tylenol) to avoid exceeding safe daily recommended intake of acetaminophen.  Can utilize ibuprofen.  OK to take sitz baths two-three times daily as needed.  Take colace as prescribed to keep stools soft.    ACTIVITY:  Avoid strenuous activity and heavy lifting for one week.  Do not drive or operative heavy machinery for at least first 24 hours after surgery; if you do not feel able to safely operate after first 24 hours or are taking narcotics (ie percocet or vicodin) then do not operative heavy machinery or drive.    DIET:  Resume previous diet.    FOLLOW-UP:   Please call office at [phone] to schedule follow-up appointment for 10-14 days from date of surgery.

## 2025-03-11 NOTE — NURSING NOTE
Pt arrived from PACU, alert and oriented, on room air, hemodynamically stable. No complaints of pain. Discharge orders placed by Dr. Ambrosio, verified with provider on discharge. Per Dr. Ambrosio no specific time for discharge, just make sure pt can tolerate PO intake before leaving. Food provided to pt. Pt educated on discharge instructions.

## 2025-03-11 NOTE — H&P
History Of Present Illness  Phi Viera is a 55 y.o. male with PMHx of diabetes mellitus type II, depression, HLD, piloidal cyst that presents to the emergency department with complaints of a pimple like area on his left buttocks.  He states that he noticed a small size pimple on his left buttocks on Saturday and over the past couple of days it has become more swollen and painful.  He states the pain was constant, took ibuprofen and tylenol for pain control, however pain continued and he decided to be further evaluated.  He states that he has had a pilondial cyst in the past that did require surgery over 5 years ago.  He states that he works for [employer] as a  and sits for long periods of time.  He does endorse a subjective fever and chills that started Saturday.  He is not on any blood thinners.  He states that he did notice the area start to drain this morning while being in the emergency department.  He denies CP, SOB, nausea, vomiting, constipation, diarrhea and urinary issues.    While in the ED, vital signs reported Tmax 36.2, , resp 20, /85, and Sp02 96% on room air.  Labs reported mild leukocytosis WBC 11.4, H&H 13.2/37.9, glucose 129, rest of labs unremarkable.  UA unremarkable.  CT abdomen and pelvis reported a rim enhancing fluid collection is seen in the perineal region, suspicious for an abscess. Further evaluation with pelvic MR fistula protocol is recommended to evaluate for a perianal fistula.      Past Medical History  Past Medical History:   Diagnosis Date    Depression     Diabetes mellitus (Multi)     Fracture of ankle     Fracture of wrist     Hyperlipidemia     Personal history of other diseases of urinary system 05/11/2016    History of hematuria    Personal history of other mental and behavioral disorders     History of attention deficit disorder       Surgical History  Past Surgical History:   Procedure Laterality Date    OTHER SURGICAL HISTORY  12/16/2015    History Of Prior  Surgery    OTHER SURGICAL HISTORY  06/06/2022    Skin lesion excision    VASECTOMY  12/16/2015    Surgery Vas Deferens Vasectomy        Social History  He reports that he has been smoking cigarettes. He started smoking about 21 years ago. He has a 37.3 pack-year smoking history. He has never used smokeless tobacco. He reports that he does not currently use alcohol. He reports that he does not use drugs.    Family History  Family History   Problem Relation Name Age of Onset    Cancer Mother Mother     Diabetes Mother Mother     Cancer Maternal Grandmother Grandmother         Allergies  Iodides, Iodinated contrast media, Atorvastatin, Fluvastatin, Lovastatin, Pravastatin, Rosuvastatin, Simvastatin, and Statins-hmg-coa reductase inhibitors    Review of Systems   Constitutional:  Positive for chills and fever.   HENT: Negative.     Respiratory: Negative.     Cardiovascular: Negative.    Gastrointestinal:  Positive for rectal pain.   Endocrine: Negative.    Genitourinary: Negative.    Musculoskeletal: Negative.    Skin:  Positive for wound.        Physical Exam  Vitals reviewed.   Constitutional:       General: He is awake.      Appearance: Normal appearance.   Cardiovascular:      Rate and Rhythm: Normal rate and regular rhythm.      Pulses: Normal pulses.      Heart sounds: Normal heart sounds.   Pulmonary:      Effort: Pulmonary effort is normal.      Breath sounds: Normal breath sounds and air entry.   Abdominal:      General: Abdomen is flat. There is no distension.      Palpations: Abdomen is soft.   Genitourinary:     Comments: Perineal abscess with fluctuance, induration, erythema,  and purulence drainage.    Musculoskeletal:         General: Normal range of motion.      Cervical back: Normal range of motion.   Skin:     General: Skin is warm and dry.   Neurological:      General: No focal deficit present.      Mental Status: He is alert and oriented to person, place, and time.   Psychiatric:         Behavior:  "Behavior is cooperative.          Last Recorded Vitals  Blood pressure 128/71, pulse 80, temperature 36.2 °C (97.2 °F), temperature source Temporal, resp. rate 18, height 1.778 m (5' 10\"), weight 97.5 kg (215 lb), SpO2 96%.    Relevant Results   Scheduled medications  methylPREDNISolone sodium succinate (PF), 40 mg, intravenous, q4h  piperacillin-tazobactam, 3.375 g, intravenous, q8h  polyethylene glycol, 17 g, oral, Daily  sennosides-docusate sodium, 2 tablet, oral, BID  vancomycin, 1,500 mg, intravenous, Once      Continuous medications     PRN medications  PRN medications: acetaminophen, ondansetron **OR** ondansetron, oxyCODONE, oxyCODONE   Results for orders placed or performed during the hospital encounter of 03/11/25 (from the past 24 hours)   CBC and Auto Differential   Result Value Ref Range    WBC 11.4 (H) 4.4 - 11.3 x10*3/uL    nRBC 0.0 0.0 - 0.0 /100 WBCs    RBC 4.10 (L) 4.50 - 5.90 x10*6/uL    Hemoglobin 13.2 (L) 13.5 - 17.5 g/dL    Hematocrit 37.9 (L) 41.0 - 52.0 %    MCV 92 80 - 100 fL    MCH 32.2 26.0 - 34.0 pg    MCHC 34.8 32.0 - 36.0 g/dL    RDW 12.0 11.5 - 14.5 %    Platelets 256 150 - 450 x10*3/uL    Neutrophils % 69.4 40.0 - 80.0 %    Immature Granulocytes %, Automated 0.3 0.0 - 0.9 %    Lymphocytes % 19.9 13.0 - 44.0 %    Monocytes % 8.2 2.0 - 10.0 %    Eosinophils % 1.8 0.0 - 6.0 %    Basophils % 0.4 0.0 - 2.0 %    Neutrophils Absolute 7.93 (H) 1.20 - 7.70 x10*3/uL    Immature Granulocytes Absolute, Automated 0.03 0.00 - 0.70 x10*3/uL    Lymphocytes Absolute 2.27 1.20 - 4.80 x10*3/uL    Monocytes Absolute 0.94 0.10 - 1.00 x10*3/uL    Eosinophils Absolute 0.20 0.00 - 0.70 x10*3/uL    Basophils Absolute 0.05 0.00 - 0.10 x10*3/uL   Comprehensive metabolic panel   Result Value Ref Range    Glucose 129 (H) 74 - 99 mg/dL    Sodium 138 136 - 145 mmol/L    Potassium 3.5 3.5 - 5.3 mmol/L    Chloride 104 98 - 107 mmol/L    Bicarbonate 23 21 - 32 mmol/L    Anion Gap 15 10 - 20 mmol/L    Urea Nitrogen 12 " 6 - 23 mg/dL    Creatinine 0.97 0.50 - 1.30 mg/dL    eGFR >90 >60 mL/min/1.73m*2    Calcium 9.7 8.6 - 10.3 mg/dL    Albumin 4.4 3.4 - 5.0 g/dL    Alkaline Phosphatase 66 33 - 120 U/L    Total Protein 7.2 6.4 - 8.2 g/dL    AST 13 9 - 39 U/L    Bilirubin, Total 0.6 0.0 - 1.2 mg/dL    ALT 20 10 - 52 U/L   Magnesium   Result Value Ref Range    Magnesium 1.94 1.60 - 2.40 mg/dL   Lactate   Result Value Ref Range    Lactate 0.7 0.4 - 2.0 mmol/L   Blood Culture    Specimen: Peripheral Venipuncture; Blood culture   Result Value Ref Range    Blood Culture Loaded on Instrument - Culture in progress    Blood Culture    Specimen: Peripheral Venipuncture; Blood culture   Result Value Ref Range    Blood Culture Loaded on Instrument - Culture in progress    Urinalysis with Reflex Culture and Microscopic   Result Value Ref Range    Color, Urine Light-Yellow Light-Yellow, Yellow, Dark-Yellow    Appearance, Urine Clear Clear    Specific Gravity, Urine >1.050 (N) 1.005 - 1.035    pH, Urine 6.0 5.0, 5.5, 6.0, 6.5, 7.0, 7.5, 8.0    Protein, Urine NEGATIVE NEGATIVE, 10 (TRACE), 20 (TRACE) mg/dL    Glucose, Urine Normal Normal mg/dL    Blood, Urine 0.06 (1+) (A) NEGATIVE mg/dL    Ketones, Urine NEGATIVE NEGATIVE mg/dL    Bilirubin, Urine NEGATIVE NEGATIVE mg/dL    Urobilinogen, Urine Normal Normal mg/dL    Nitrite, Urine NEGATIVE NEGATIVE    Leukocyte Esterase, Urine NEGATIVE NEGATIVE   Urinalysis Microscopic   Result Value Ref Range    WBC, Urine 1-5 1-5, NONE /HPF    RBC, Urine 1-2 NONE, 1-2, 3-5 /HPF      CT abdomen pelvis w IV contrast    Result Date: 3/11/2025  Interpreted By:  Ivon Deleon, STUDY: CT ABDOMEN PELVIS W IV CONTRAST;  3/11/2025 8:16 am   INDICATION: Signs/Symptoms:To evaluate for perianal abscess, or rectoperineal fistula...     COMPARISON: CT abdomen 04/26/2005 CT chest 04/29/2024   ACCESSION NUMBER(S): [id number]   ORDERING CLINICIAN: ARTEM CARR   TECHNIQUE: CT of the abdomen and pelvis was performed.  Standard  contiguous axial images were obtained at 3 mm slice thickness through the abdomen and pelvis. Coronal and sagittal reconstructions at 3 mm slice thickness were performed.  75 ML of Omnipaque 350 was administered intravenously without immediate complication.   FINDINGS:   LOWER CHEST: The heart is normal in size without evidence of pericardial effusion. Trace bilateral pleural effusions. Dependent ground-glass opacities in the lung bases. No concerning lung nodule. The visualized distal esophagus appears unremarkable.   ABDOMEN:   LIVER: The liver is normal in size. Focal hypoattenuation is seen adjacent to the falciform ligament, likely focal hepatic steatosis. No suspicious liver lesion.   BILE DUCTS: No biliary duct dilatation.   GALLBLADDER: The gallbladder is nondistended and without evidence of radiopaque stones.   PANCREAS: The pancreas appears unremarkable without evidence of ductal dilatation or masses.   SPLEEN: The spleen is normal in size.   ADRENAL GLANDS: New 19 mm left adrenal nodular thickening, with 31 Hounsfield units. No right adrenal nodularity or thickening.   KIDNEYS AND URETERS: The kidneys are normal in size. Hypoattenuating lesions in the bilateral kidneys, likely benign. Multiple nonobstructive calculi are seen in the left kidney, measuring up to 8 mm. No hydroureteronephrosis or right nephroureterolithiasis is identified.   PELVIS:   BLADDER: The urinary bladder appears normal without abnormal wall thickening.   REPRODUCTIVE ORGANS: No pelvic masses.   BOWEL: The stomach is unremarkable. The small and large bowel are normal in caliber and demonstrate no wall thickening. The appendix is not definitely visualized. There is however no pericecal stranding or fluid.   VESSELS: Mild atherosclerotic calcifications of the aortoiliac arteries. No aneurysmal dilatation of the abdominal aorta. The IVC appears normal.   PERITONEUM/RETROPERITONEUM/LYMPH NODES: No ascites or fluid collection.  Subcentimeter para-aortic, bilateral iliac chain, and bilateral inguinal lymph nodes are noted, which are nonenlarged by size criteria, likely reactive.   ABDOMINAL WALL: A 53 x 18 mm rim enhancing fluid collection is seen in the perineal region on series 2, image 210.   BONES: No acute fracture. No suspicious osseous lesions.       1. A rim enhancing fluid collection is seen in the perineal region, suspicious for an abscess. Further evaluation with pelvic MR fistula protocol is recommended to evaluate for a perianal fistula. 2. Trace bilateral pleural effusions. 3. Indeterminate left adrenal nodular thickening, for which further evaluation with nonemergent CT adrenal protocol imaging is recommended.     MACRO: None   Signed by: Ivon Deleon 3/11/2025 8:41 AM Dictation workstation:   [id number]       Assessment/Plan   Assessment & Plan  Perineal abscess      - NPO.  Last meal was yesterday evening 8pm  - pain control  - ABX: zosyn  - IVF for hydration  - Plan for I&D in the OR with Dr. Ambrosio     #Diabetes Mellitus  - Accuchecks with lispro coverage    #HLD  - Home vascepa resumed    #Depression  #ADD  - Home lamictal and prozac resumed     #GERD  - Home PPI resumed       Plan of care discussed with Dr. Hebert Mcclendon, APRN-CNP    I spent 60 minutes in the professional and overall care of this patient.

## 2025-03-11 NOTE — ANESTHESIA PROCEDURE NOTES
Airway  Date/Time: 3/11/2025 5:22 PM  Urgency: elective    Airway not difficult    Staffing  Performed: SASHA   Authorized by: Jody Russo MD    Performed by: SASHA Shea  Patient location during procedure: OR    Indications and Patient Condition  Indications for airway management: anesthesia  Spontaneous Ventilation: absent  Sedation level: deep  Preoxygenated: yes  Patient position: sniffing  MILS maintained throughout  Mask difficulty assessment: 1 - vent by mask  Planned trial extubation    Final Airway Details  Final airway type: supraglottic airway      Successful airway: Supreme  Size 5     Number of attempts at approach: 1  Ventilation between attempts: supraglottic airway

## 2025-03-12 NOTE — DISCHARGE SUMMARY
Discharge Diagnosis  Perianal abscess    Issues Requiring Follow-Up  Post-operative follow-up    Test Results Pending At Discharge  Pending Labs       Order Current Status    Tissue/Wound Culture/Smear In process    Blood Culture Preliminary result    Blood Culture Preliminary result            Hospital Course   Patient presented to [facility] ED for evaluation of anal pain and drainage.  He was found to have perianal abscess.  Surgery was consulted.  Patient was taken to operating room for incision and drainage of abscess, insertion of penrose drain.  Patient tolerated surgery well.  Patient discharged to home with additional antibiotic therapy, small Rx for percocet, and instructions for wound care, activity, and follow-up.    Pertinent Physical Exam At Time of Discharge  Physical Exam  Left anterior perianal abscess incision and drainage site with penrose in place.    Home Medications     Medication List      START taking these medications     amoxicillin-pot clavulanate 875-125 mg tablet; Commonly known as:   Augmentin; Take 1 tablet (875 mg) by mouth 2 times a day for 7 days.   oxyCODONE-acetaminophen 5-325 mg tablet; Commonly known as: Percocet;   Take 1 tablet by mouth every 6 hours if needed for severe pain (7 - 10).     CONTINUE taking these medications     amLODIPine 5 mg tablet; Commonly known as: Norvasc; Take 1 tablet (5 mg)   by mouth once daily.   aspirin 81 mg EC tablet   Benefiber Sugar Free (dextrin) 3 gram/4 gram powder; Generic drug: wheat   dextrin; Take 1 teaspoon daily with a full glass of water (8 ounces)   cholecalciferol 50 mcg (2,000 unit) capsule; Commonly known as: Vitamin   D-3; Take 2 capsules (100 mcg) by mouth early in the morning..   cyclobenzaprine 10 mg tablet; Commonly known as: Flexeril; Take 1 tablet   (10 mg) by mouth 3 times a day as needed for muscle spasms.   docusate sodium 100 mg capsule; Commonly known as: Colace; Take 1   capsule (100 mg) by mouth once daily.   FLUoxetine 20  mg capsule; Commonly known as: PROzac   fluticasone 50 mcg/actuation nasal spray; Commonly known as: Flonase   icosapent ethyL 1 gram capsule; Commonly known as: Vascepa; Take 2   capsules (2 g) by mouth 2 times a day.   lamoTRIgine 25 mg tablet; Commonly known as: LaMICtal   Nexlizet 180-10 mg tablet; Generic drug: bempedoic acid-ezetimibe; Take   1 tablet by mouth once daily.   omeprazole 20 mg DR capsule; Commonly known as: PriLOSEC; TAKE 1 CAPSULE   (20 MG) BY MOUTH ONCE DAILY. DO NOT CRUSH, CHEW, OR SPLIT.   OneTouch Verio test strips strip; Generic drug: blood sugar diagnostic;   1 strip once daily.   risperiDONE 1 mg tablet; Commonly known as: RisperDAL   Trulicity 1.5 mg/0.5 mL pen injector injection; Generic drug:   dulaglutide; Inject 1.5 mg under the skin 1 (one) time per week.     ASK your doctor about these medications     meloxicam 15 mg tablet; Commonly known as: Mobic; Take 1 tablet (15 mg)   by mouth once daily.       Outpatient Follow-Up  Future Appointments   Date Time Provider Department Center   3/15/2025  9:20 AM Stan Jensen DO [id number] [place]   6/2/2025  8:20 AM Chelo Ibanez MD [id number] [place]   8/25/2025  1:00 PM SENAIT Thompson-CNP [id number]   1/20/2026  8:00 AM Tracy M Schwab, APRN-CNP [id number] [place]       James Ambrosio MD

## 2025-03-14 LAB
BACTERIA SPEC CULT: ABNORMAL
GRAM STN SPEC: ABNORMAL
GRAM STN SPEC: ABNORMAL

## 2025-03-15 ENCOUNTER — APPOINTMENT (OUTPATIENT)
Dept: PRIMARY CARE | Facility: CLINIC | Age: 55
End: 2025-03-15
Payer: COMMERCIAL

## 2025-03-15 VITALS
HEART RATE: 79 BPM | HEIGHT: 70 IN | WEIGHT: 215 LBS | OXYGEN SATURATION: 95 % | DIASTOLIC BLOOD PRESSURE: 76 MMHG | SYSTOLIC BLOOD PRESSURE: 128 MMHG | BODY MASS INDEX: 30.78 KG/M2 | TEMPERATURE: 97.9 F

## 2025-03-15 DIAGNOSIS — R93.5 ABNORMAL ABDOMINAL CT SCAN: ICD-10-CM

## 2025-03-15 DIAGNOSIS — E11.9 TYPE 2 DIABETES MELLITUS WITHOUT COMPLICATION, WITHOUT LONG-TERM CURRENT USE OF INSULIN (MULTI): ICD-10-CM

## 2025-03-15 DIAGNOSIS — I10 BENIGN ESSENTIAL HYPERTENSION: Primary | ICD-10-CM

## 2025-03-15 DIAGNOSIS — F17.210 CIGARETTE SMOKER: ICD-10-CM

## 2025-03-15 DIAGNOSIS — E55.9 VITAMIN D DEFICIENCY: ICD-10-CM

## 2025-03-15 DIAGNOSIS — E78.2 MIXED HYPERLIPIDEMIA: ICD-10-CM

## 2025-03-15 DIAGNOSIS — E27.9 ADRENAL ABNORMALITY (MULTI): ICD-10-CM

## 2025-03-15 DIAGNOSIS — K61.0 PERIANAL ABSCESS: ICD-10-CM

## 2025-03-15 LAB
BACTERIA BLD CULT: NORMAL
BACTERIA BLD CULT: NORMAL

## 2025-03-15 PROCEDURE — 99214 OFFICE O/P EST MOD 30 MIN: CPT | Performed by: FAMILY MEDICINE

## 2025-03-15 PROCEDURE — 3008F BODY MASS INDEX DOCD: CPT | Performed by: FAMILY MEDICINE

## 2025-03-15 PROCEDURE — 3078F DIAST BP <80 MM HG: CPT | Performed by: FAMILY MEDICINE

## 2025-03-15 PROCEDURE — 3074F SYST BP LT 130 MM HG: CPT | Performed by: FAMILY MEDICINE

## 2025-03-15 RX ORDER — DULAGLUTIDE 1.5 MG/.5ML
1.5 INJECTION, SOLUTION SUBCUTANEOUS
Qty: 12 EACH | Refills: 0 | Status: SHIPPED | OUTPATIENT
Start: 2025-03-16 | End: 2025-06-14

## 2025-03-15 RX ORDER — ACETAMINOPHEN 500 MG
TABLET ORAL
Status: SHIPPED
Start: 2025-03-15

## 2025-03-15 RX ORDER — ICOSAPENT ETHYL 1 G/1
2 CAPSULE ORAL 2 TIMES DAILY
Qty: 360 CAPSULE | Refills: 0 | Status: SHIPPED | OUTPATIENT
Start: 2025-03-15 | End: 2025-06-13

## 2025-03-15 ASSESSMENT — PATIENT HEALTH QUESTIONNAIRE - PHQ9
2. FEELING DOWN, DEPRESSED OR HOPELESS: NOT AT ALL
1. LITTLE INTEREST OR PLEASURE IN DOING THINGS: NOT AT ALL
SUM OF ALL RESPONSES TO PHQ9 QUESTIONS 1 AND 2: 0

## 2025-03-15 NOTE — PROGRESS NOTES
"Subjective   Patient ID: Phi Viera is a 55 y.o. male who presents for Follow-up.    HPI     Patient wife reported ER visit for perianal abscess, she states he went to surgery and the abscess was drained at [place] on 03/11/2025.  Penrose drain inserted and discharged on antibiotics.    Patient reports continue smoking.     Patient wife states  he had CT done at ER and they found an abnormality of the left adrenal gland.  Follow-up was recommended.    Review labs: 02/10/2025  PSA: 11/11/2024  Colon: 10/21/2022      Patient has HTN.  He does not monitor his BP at home.   He reports that cardiology started him on amlodipine for elevated BP.  Denies chest pain, dizziness, LE swelling.      He has type 2 diabetes.  He does monitor his sugars at home & states they are improving but have not returned to previous baseline.   Pt denies any polyuria, polydipsia, polyphagia.     He has hyperlipidemia.  Patient tries to limit the amount of fatty foods and high cholesterol foods that are consumed.  He has been compliant with current medication and denies any noted side effects.  HE HAS BEEN INTOLERANT OF MULTIPLE STATINS DUE TO MYALGIA.     He has known vitamin D deficiency.  Pt has been compliant with Vit D supplementation.     He has depression.  Symptoms have been stable.  He continues to follow with his psychiatrist on a regular basis.          Review of Systems    Objective   /76   Pulse 79   Temp 36.6 °C (97.9 °F) (Temporal)   Ht 1.778 m (5' 10\")   Wt 97.5 kg (215 lb)   SpO2 95%   BMI 30.85 kg/m²     Physical Exam    Assessment/Plan     HTN:    Stable based on in office readings.  Blood pressure appears adequately controlled and we will continue with the current antihypertensive therapy.     Hyperlipidemia:   Stable based on labs.  Patient will continue with the current medication.  Dietary changes, exercise, and maintenance of healthy weight were discussed at length.  Lab Results   Component Value Date    " CHOL 143 11/11/2024    CHOL 124 04/01/2024    CHOL 120 10/16/2023     Lab Results   Component Value Date    HDL 34.4 11/11/2024    HDL 36.9 04/01/2024    HDL 32.1 10/16/2023     Lab Results   Component Value Date    LDLCALC 53 11/11/2024    LDLCALC 44 04/01/2024    LDLCALC 41 10/16/2023     Lab Results   Component Value Date    TRIG 276 (H) 11/11/2024    TRIG 215 (H) 04/01/2024    TRIG 234 (H) 10/16/2023         Type 2 diabetes mellitus without complication, without long-term current use of insulin  Well controlled at this time.  Continue the current medications.  Lab Results   Component Value Date    HGBA1C 6.3 (H) 02/10/2025       Vitamin D deficiency  Lab Results   Component Value Date    VITD25 28 (L) 02/10/2025    VITD25 32 04/01/2024    VITD25 25 (L) 01/15/2024   Vitamin D was low on his most recent labs.  Pt increased the dose after seeing 2/2025 labs.  Continue on the 4000 units 3 days per week and 2000 units 4 days per week.       Cigarette smoker:  He recently started smoking again.  Patient understands that continued smoking will increase the risks of lung disease, vascular disease, and multiple malignancies.  Pt. has been encouraged to work on smoking cessation and available smoking cessation aids were discussed.  Declines any smoking cessation assistance at this time.    Perianal abscess:  Underwent surgical drainage on 3/11/2025.  Pain is significantly improved.  He continues on antibiotics without side effect.  Will follow up with surgery for drain removal and recheck.    Adrenal abnormality:  3/11/2025 CT scan revealed the following:  Indeterminate left adrenal nodular thickening, for which further  evaluation with nonemergent CT adrenal protocol imaging is recommended.        Orders Placed This Encounter   Procedures    CT lung screening low dose    Basic Metabolic Panel    Lipid Panel    Albumin-Creatinine Ratio, Urine Random    Vitamin D 25-Hydroxy,Total (for eval of Vitamin D levels)     Hemoglobin A1C     Requested Prescriptions     Signed Prescriptions Disp Refills    icosapent ethyL (Vascepa) 1 gram capsule 360 capsule 0     Sig: Take 2 capsules (2 g) by mouth 2 times a day.    dulaglutide (Trulicity) 1.5 mg/0.5 mL pen injector injection 12 each 0     Sig: Inject 1.5 mg under the skin 1 (one) time per week.    cholecalciferol (Vitamin D-3) 50 mcg (2,000 unit) capsule       Sig: Take 4000 units 3 days out of the week and 2000 units the rest of the days.

## 2025-03-15 NOTE — PATIENT INSTRUCTIONS
Please call the surgeon for follow up on the abscess.    Follow up in 3 months with labs to be done PRIOR.    It was a pleasure to see you today. Thank you for choosing us for your health care needs.    If you have lab or other testing completed and have not been informed of results within one week, please call the office for your results.    If you haven't done so, consider signing up for [facility], the [facility] personal health record. Ask the staff how you can get started.

## 2025-03-21 ENCOUNTER — OFFICE VISIT (OUTPATIENT)
Dept: SURGERY | Facility: CLINIC | Age: 55
End: 2025-03-21
Payer: COMMERCIAL

## 2025-03-21 VITALS
HEART RATE: 98 BPM | SYSTOLIC BLOOD PRESSURE: 126 MMHG | DIASTOLIC BLOOD PRESSURE: 77 MMHG | WEIGHT: 218 LBS | BODY MASS INDEX: 31.28 KG/M2

## 2025-03-21 DIAGNOSIS — L02.215 PERINEAL ABSCESS: Primary | ICD-10-CM

## 2025-03-21 PROCEDURE — 3074F SYST BP LT 130 MM HG: CPT | Performed by: NURSE PRACTITIONER

## 2025-03-21 PROCEDURE — 3078F DIAST BP <80 MM HG: CPT | Performed by: NURSE PRACTITIONER

## 2025-03-21 PROCEDURE — 99211 OFF/OP EST MAY X REQ PHY/QHP: CPT | Performed by: NURSE PRACTITIONER

## 2025-03-21 RX ORDER — AMOXICILLIN AND CLAVULANATE POTASSIUM 875; 125 MG/1; MG/1
1 TABLET, FILM COATED ORAL 2 TIMES DAILY
Qty: 14 TABLET | Refills: 0 | Status: SHIPPED | OUTPATIENT
Start: 2025-03-21 | End: 2025-03-28

## 2025-03-21 ASSESSMENT — ENCOUNTER SYMPTOMS: RECTAL PAIN: 1

## 2025-03-21 NOTE — LETTER
March 21, 2025     Patient: Phi Viera   YOB: 1970   Date of Visit: 3/21/2025       To Whom It May Concern:    It is my medical opinion that Phi Viera may return to work on 3/17/25 with no restrictions.  He had a procedure with us on 3/11/25 .    If you have any questions or concerns, please don't hesitate to call.         Sincerely,        Leah Funes, APRN-CNP    CC: No Recipients

## 2025-03-21 NOTE — PROGRESS NOTES
History Of Present Illness  Phi Viera is a 55 y.o. male who is s/p EUA, I&D of perineal abscess with penrose drain insertion on 3/11/25 by Dr. Ambrosio.     He only has a little drainage on his underwear from the abscess.  No c/o any fever/chills.  He still has discomfoft on the perineum with more burning and itching to the area.  He has no issues with his BM's.    Past Medical History  He has a past medical history of Depression, Diabetes mellitus (Multi), Fracture of ankle, Fracture of wrist, Hyperlipidemia, Personal history of other diseases of urinary system (05/11/2016), and Personal history of other mental and behavioral disorders.    Surgical History  He has a past surgical history that includes Vasectomy (12/16/2015); Other surgical history (12/16/2015); and Other surgical history (06/06/2022).     Social History  He reports that he has been smoking cigarettes. He started smoking about 21 years ago. He has a 37.3 pack-year smoking history. He has never used smokeless tobacco. He reports that he does not currently use alcohol. He reports that he does not use drugs.    Family History  Family History   Problem Relation Name Age of Onset    Cancer Mother Mother     Diabetes Mother Mother     Cancer Maternal Grandmother Grandmother         Allergies  Iodides, Iodinated contrast media, Atorvastatin, Fluvastatin, Lovastatin, Pravastatin, Rosuvastatin, Simvastatin, and Statins-hmg-coa reductase inhibitors    Review of Systems   Gastrointestinal:  Positive for rectal pain.   All other systems reviewed and are negative.       Physical Exam  Exam conducted with a chaperone present.   Constitutional:       Appearance: Normal appearance.   HENT:      Head: Normocephalic and atraumatic.   Pulmonary:      Effort: Pulmonary effort is normal.   Genitourinary:     Comments: Pruritus ani.  He has a penrose drainage from the perineum to the base of the scrotum.  It was removed today.  Tenderness over the entire area.  He has a  new small abscess, that is not draining in the left mid buttock.  There is some redness, pain and swelling to the area with a little tracking upwards and downwards.  Musculoskeletal:         General: Normal range of motion.   Skin:     General: Skin is warm and dry.   Neurological:      General: No focal deficit present.      Mental Status: He is alert and oriented to person, place, and time.   Psychiatric:         Mood and Affect: Mood normal.         Behavior: Behavior normal.          Last Recorded Vitals  /77   Pulse 98   Wt 98.9 kg (218 lb)        Assessment/Plan   Phi's penrose drain was removed today from the perineum.  He has a new area of swelling, redness and pain in the left mid gluteus.  He will start taking Augmentin for a week.  He will continue to do sitz baths prn.  He will keep gauze in the perineum to help absorb the drainage.  For the pruritus ani, he will use Desitin ointment prn.   He will try to cut down on his smoking.  He will call with any issues and will follow up in 6 weeks.         Leah Funes, APRN-CNP

## 2025-03-29 ENCOUNTER — HOSPITAL ENCOUNTER (OUTPATIENT)
Dept: RADIOLOGY | Facility: HOSPITAL | Age: 55
Discharge: HOME | End: 2025-03-29
Payer: COMMERCIAL

## 2025-03-29 DIAGNOSIS — E27.9 ADRENAL ABNORMALITY (MULTI): ICD-10-CM

## 2025-03-29 DIAGNOSIS — R93.5 ABNORMAL ABDOMINAL CT SCAN: ICD-10-CM

## 2025-03-29 PROCEDURE — 74181 MRI ABDOMEN W/O CONTRAST: CPT | Performed by: RADIOLOGY

## 2025-03-29 PROCEDURE — 74181 MRI ABDOMEN W/O CONTRAST: CPT

## 2025-05-12 ENCOUNTER — APPOINTMENT (OUTPATIENT)
Dept: DERMATOLOGY | Facility: CLINIC | Age: 55
End: 2025-05-12
Payer: COMMERCIAL

## 2025-05-25 LAB
25(OH)D3+25(OH)D2 SERPL-MCNC: 32 NG/ML (ref 30–100)
ALBUMIN/CREAT UR: NORMAL
ANION GAP SERPL CALCULATED.4IONS-SCNC: 7 MMOL/L (CALC) (ref 7–17)
BUN SERPL-MCNC: 13 MG/DL (ref 7–25)
BUN/CREAT SERPL: NORMAL (CALC) (ref 6–22)
CALCIUM SERPL-MCNC: 9.6 MG/DL (ref 8.6–10.3)
CHLORIDE SERPL-SCNC: 103 MMOL/L (ref 98–110)
CHOLEST SERPL-MCNC: 179 MG/DL
CHOLEST/HDLC SERPL: 4.5 (CALC)
CO2 SERPL-SCNC: 25 MMOL/L (ref 20–32)
CREAT SERPL-MCNC: 1.02 MG/DL (ref 0.7–1.3)
CREAT UR-MCNC: NORMAL MG/DL
EGFRCR SERPLBLD CKD-EPI 2021: 88 ML/MIN/1.73M2
EST. AVERAGE GLUCOSE BLD GHB EST-MCNC: 154 MG/DL
EST. AVERAGE GLUCOSE BLD GHB EST-SCNC: 8.5 MMOL/L
GLUCOSE SERPL-MCNC: 99 MG/DL (ref 65–99)
HBA1C MFR BLD: 7 %
HDLC SERPL-MCNC: 40 MG/DL
LDLC SERPL CALC-MCNC: 114 MG/DL (CALC)
MICROALBUMIN UR-MCNC: NORMAL
NONHDLC SERPL-MCNC: 139 MG/DL (CALC)
POTASSIUM SERPL-SCNC: 4.7 MMOL/L (ref 3.5–5.3)
SODIUM SERPL-SCNC: 135 MMOL/L (ref 135–146)
TRIGL SERPL-MCNC: 131 MG/DL

## 2025-05-27 LAB
25(OH)D3+25(OH)D2 SERPL-MCNC: 32 NG/ML (ref 30–100)
ALBUMIN/CREAT UR: 7 MG/G CREAT
ANION GAP SERPL CALCULATED.4IONS-SCNC: 7 MMOL/L (CALC) (ref 7–17)
BUN SERPL-MCNC: 13 MG/DL (ref 7–25)
BUN/CREAT SERPL: NORMAL (CALC) (ref 6–22)
CALCIUM SERPL-MCNC: 9.6 MG/DL (ref 8.6–10.3)
CHLORIDE SERPL-SCNC: 103 MMOL/L (ref 98–110)
CHOLEST SERPL-MCNC: 179 MG/DL
CHOLEST/HDLC SERPL: 4.5 (CALC)
CO2 SERPL-SCNC: 25 MMOL/L (ref 20–32)
CREAT SERPL-MCNC: 1.02 MG/DL (ref 0.7–1.3)
CREAT UR-MCNC: 84 MG/DL (ref 20–320)
EGFRCR SERPLBLD CKD-EPI 2021: 88 ML/MIN/1.73M2
EST. AVERAGE GLUCOSE BLD GHB EST-MCNC: 154 MG/DL
EST. AVERAGE GLUCOSE BLD GHB EST-SCNC: 8.5 MMOL/L
GLUCOSE SERPL-MCNC: 99 MG/DL (ref 65–99)
HBA1C MFR BLD: 7 %
HDLC SERPL-MCNC: 40 MG/DL
LDLC SERPL CALC-MCNC: 114 MG/DL (CALC)
MICROALBUMIN UR-MCNC: 0.6 MG/DL
NONHDLC SERPL-MCNC: 139 MG/DL (CALC)
POTASSIUM SERPL-SCNC: 4.7 MMOL/L (ref 3.5–5.3)
SODIUM SERPL-SCNC: 135 MMOL/L (ref 135–146)
TRIGL SERPL-MCNC: 131 MG/DL

## 2025-05-30 NOTE — PROGRESS NOTES
Cardiology Office Note    PCP: Stan Jensen   Cardiologist: Dr. Theo Simeon     Mr. Viera is a 54 yo M with hx as listed below who returns to Cardiology Clinic for follow-up visit; last seen by me in 6/2024. Doing very well and denies CP, SOB, dizziness, LH, LE edema, or palpitations. Works as  and walks 15 miles per day on average. Strong family hx of high cholesterol and CAD. Unfortunately, smoking 1/2 PPD again. Blood sugars up to HgbA1c 7% and eating more and terribly; has gained 12 lbs over 9 months. ECG shows NSR.     PAST MEDICAL/SURGICAL HISTORY:  -DLD with elevated Lp(a)  -DM  -tobacco abuse  -HTN  -depression/anxiety  -EMI on CPAP  -kidney stone  -ADD     PRIOR CARDIAC TESTING:  -CAC (2024): 495  -Treadmill ECG stress test (2022): negative  -TTE (2018): EF 55-60%  -Treadmill stress test (2018): negative  -Treadmill nuclear stress test (2014): negative  -TTE (2014): EF 60-65%, RVSP 36    PHYSICAL EXAM:  Vitals: HR 77, /75  Constitutional: alert and in no acute distress.   Eyes: no erythema, swelling or discharge from the eye .   Neck: neck is supple, symmetric, trachea midline, no masses  and no thyromegaly .   Pulmonary: no increased work of breathing or signs of respiratory distress  and lungs clear to auscultation.    Cardiovascular: carotid pulses 2+ bilaterally with no bruit , JVP was normal, no thrills , regular rhythm, normal S1 and S2, no murmurs , pedal pulses 2+ bilaterally  and no edema .   Abdomen: abdomen non-tender, no masses  and no hepatomegaly .   Skin: skin warm and dry, normal skin turgor .   Psychiatric judgment and insight is normal , oriented to person, place and time  and normal mood and affect .    LABS  Recent Labs     03/11/25  0246 04/15/22  1245   WBC 11.4* 6.6   HGB 13.2* 15.3   HCT 37.9* 41.6    279   MCV 92 92     Recent Labs     05/24/25  1222 03/11/25  0246 02/10/25  1317 11/11/24  0809 07/22/24  0703 01/15/24  0700 10/16/23  0649 04/03/23  0939   NA  135 138 141 140 141   < > 139 139   K 4.7 3.5 4.7 4.0 4.2   < > 4.2 4.3    104 105 107 106   < > 105 106   CO2 25 23 28 27 27   < > 28 28   ANIONGAP 7 15 8 10 12   < > 10 9*   BUN 13 12 14 9 7   < > 15 10   CREATININE 1.02 0.97 1.11 1.04 1.10   < > 1.22 0.96   EGFR 88 >90 80 86 81   < > 72  --    MG  --  1.94  --   --   --   --   --   --    ALBUMIN  --  4.4 4.8  --  4.5  --  4.4 4.7   ALKPHOS  --  66 58  --  51  --  56 44   ALT  --  20 21  --  23  --  15 31   AST  --  13 21  --  23  --  17 23   BILITOT  --  0.6 0.4  --  0.4  --  0.3 0.6    < > = values in this interval not displayed.     Recent Labs     02/10/25  1317 04/01/24  0639 12/28/22  0708 10/06/22  0640 06/05/21  0845   TSH 0.68 0.48 0.79 0.41* 0.45      Recent Labs     05/24/25  1222 02/10/25  1317 11/11/24  0809 07/22/24  0703 04/01/24  0639 10/16/23  0649 04/03/23  0939 12/28/22  0708 10/06/22  0640   CHOL 179  --  143  --  124   < > 128 137 134   LDLF  --   --   --   --   --   --  59 34 63   HDL 40  --  34.4  --  36.9   < > 27.9* 32.4* 36.0*   TRIG 131  --  276*  --  215*   < > 205* 351* 174*   HGBA1C 7.0* 6.3* 6.6*   < > 5.5   < > 5.4 5.9* 6.5*    < > = values in this interval not displayed.     ALLERGIES  Allergies   Allergen Reactions    Iodides Hives, Rash and Confusion    Iodinated Contrast Media Hives    Atorvastatin Myalgia    Fluvastatin Myalgia    Lovastatin Myalgia    Pravastatin Myalgia    Rosuvastatin Myalgia    Simvastatin Myalgia    Statins-Hmg-Coa Reductase Inhibitors Myalgia     MEDICATIONS  Current Outpatient Medications   Medication Instructions    amLODIPine (NORVASC) 5 mg, oral, Daily    aspirin 81 mg, Daily    bempedoic acid-ezetimibe (Nexlizet) 180-10 mg tablet 1 tablet, oral, Daily    blood sugar diagnostic (OneTouch Verio test strips) strip 1 strip, miscellaneous, Daily    cholecalciferol (Vitamin D-3) 50 mcg (2,000 unit) capsule Take 4000 units 3 days out of the week and 2000 units the rest of the days.    cyclobenzaprine  (FLEXERIL) 10 mg, oral, 3 times daily PRN    docusate sodium (COLACE) 100 mg, oral, Daily    FLUoxetine (PROZAC) 20 mg, Every morning    fluticasone (Flonase) 50 mcg/actuation nasal spray 2 sprays, Daily    icosapent ethyL (VASCEPA) 2 g, oral, 2 times daily    lamoTRIgine (LaMICtal) 25 mg tablet 2 tablets, Nightly    omeprazole (PriLOSEC) 20 mg DR capsule TAKE 1 CAPSULE (20 MG) BY MOUTH ONCE DAILY. DO NOT CRUSH, CHEW, OR SPLIT.    risperiDONE (RISPERDAL) 1 mg, 2 times daily    Trulicity 1.5 mg, subcutaneous, Once Weekly    wheat dextrin (Benefiber Sugar Free, dextrin,) 3 gram/4 gram powder Take 1 teaspoon daily with a full glass of water (8 ounces)     ASSESSMENT/PLAN: 56 yo M with hx of HTN, DM, EMI on CPAP, and DLD with elevated Lp(a) here for follow-up visit.  and . Continue vascepa 2g BID and nexlizet 180/10 mg daily. If TG remain high, could consider trial of fibrate though with statin intolerance, higher risk of myalgia. On ASA 81 mg daily for primary prevention; defer to PCP/Dr. Simeon. BP controlled on amlodipine 5 mg daily. Great blood sugar control with HgbA1c 7% on trulicity 1.5 mg weekly. Applauded weight loss and quitting smoking! RTC November.    Labs October  See me November    Not want to change med but work on diet/lifestyle/quitting smoking first

## 2025-06-02 ENCOUNTER — HOSPITAL ENCOUNTER (OUTPATIENT)
Dept: RADIOLOGY | Facility: HOSPITAL | Age: 55
Discharge: HOME | End: 2025-06-02
Payer: COMMERCIAL

## 2025-06-02 ENCOUNTER — OFFICE VISIT (OUTPATIENT)
Dept: CARDIOLOGY | Facility: CLINIC | Age: 55
End: 2025-06-02
Payer: COMMERCIAL

## 2025-06-02 VITALS
HEIGHT: 70 IN | SYSTOLIC BLOOD PRESSURE: 115 MMHG | DIASTOLIC BLOOD PRESSURE: 75 MMHG | HEART RATE: 77 BPM | WEIGHT: 215 LBS | OXYGEN SATURATION: 93 % | BODY MASS INDEX: 30.78 KG/M2

## 2025-06-02 DIAGNOSIS — E11.9 TYPE 2 DIABETES MELLITUS WITHOUT COMPLICATION, WITHOUT LONG-TERM CURRENT USE OF INSULIN: ICD-10-CM

## 2025-06-02 DIAGNOSIS — I25.10 CORONARY ARTERY CALCIFICATION: ICD-10-CM

## 2025-06-02 DIAGNOSIS — E78.5 DYSLIPIDEMIA: ICD-10-CM

## 2025-06-02 DIAGNOSIS — I10 BENIGN ESSENTIAL HYPERTENSION: ICD-10-CM

## 2025-06-02 DIAGNOSIS — Z82.49 FAMILY HISTORY OF ISCHEMIC HEART DISEASE: ICD-10-CM

## 2025-06-02 DIAGNOSIS — I10 ESSENTIAL HYPERTENSION, BENIGN: Primary | ICD-10-CM

## 2025-06-02 DIAGNOSIS — F17.210 CIGARETTE SMOKER: ICD-10-CM

## 2025-06-02 PROCEDURE — 3078F DIAST BP <80 MM HG: CPT | Performed by: INTERNAL MEDICINE

## 2025-06-02 PROCEDURE — 3074F SYST BP LT 130 MM HG: CPT | Performed by: INTERNAL MEDICINE

## 2025-06-02 PROCEDURE — 3008F BODY MASS INDEX DOCD: CPT | Performed by: INTERNAL MEDICINE

## 2025-06-02 PROCEDURE — 71271 CT THORAX LUNG CANCER SCR C-: CPT

## 2025-06-02 PROCEDURE — 71271 CT THORAX LUNG CANCER SCR C-: CPT | Performed by: RADIOLOGY

## 2025-06-02 PROCEDURE — 99214 OFFICE O/P EST MOD 30 MIN: CPT | Performed by: INTERNAL MEDICINE

## 2025-06-02 PROCEDURE — 99212 OFFICE O/P EST SF 10 MIN: CPT

## 2025-06-02 RX ORDER — BEMPEDOIC ACID AND EZETIMIBE 180; 10 MG/1; MG/1
1 TABLET, FILM COATED ORAL DAILY
Qty: 90 TABLET | Refills: 3 | Status: SHIPPED | OUTPATIENT
Start: 2025-06-02

## 2025-06-02 ASSESSMENT — ENCOUNTER SYMPTOMS
OCCASIONAL FEELINGS OF UNSTEADINESS: 0
DEPRESSION: 0
LOSS OF SENSATION IN FEET: 0

## 2025-06-02 ASSESSMENT — PAIN SCALES - GENERAL: PAINLEVEL_OUTOF10: 0-NO PAIN

## 2025-06-15 DIAGNOSIS — E78.2 MIXED HYPERLIPIDEMIA: ICD-10-CM

## 2025-06-15 DIAGNOSIS — I10 BENIGN ESSENTIAL HYPERTENSION: ICD-10-CM

## 2025-06-15 DIAGNOSIS — E55.9 VITAMIN D DEFICIENCY: ICD-10-CM

## 2025-06-15 DIAGNOSIS — E11.9 TYPE 2 DIABETES MELLITUS WITHOUT COMPLICATION, WITHOUT LONG-TERM CURRENT USE OF INSULIN: ICD-10-CM

## 2025-06-21 ENCOUNTER — APPOINTMENT (OUTPATIENT)
Dept: PRIMARY CARE | Facility: CLINIC | Age: 55
End: 2025-06-21
Payer: COMMERCIAL

## 2025-06-21 VITALS
OXYGEN SATURATION: 95 % | HEART RATE: 66 BPM | HEIGHT: 70 IN | WEIGHT: 215.6 LBS | BODY MASS INDEX: 30.86 KG/M2 | TEMPERATURE: 98.1 F | DIASTOLIC BLOOD PRESSURE: 76 MMHG | SYSTOLIC BLOOD PRESSURE: 119 MMHG

## 2025-06-21 DIAGNOSIS — E11.9 TYPE 2 DIABETES MELLITUS WITHOUT COMPLICATION, WITHOUT LONG-TERM CURRENT USE OF INSULIN: ICD-10-CM

## 2025-06-21 DIAGNOSIS — E55.9 VITAMIN D DEFICIENCY: ICD-10-CM

## 2025-06-21 DIAGNOSIS — J43.2 CENTRILOBULAR EMPHYSEMA (MULTI): ICD-10-CM

## 2025-06-21 DIAGNOSIS — E78.2 MIXED HYPERLIPIDEMIA: ICD-10-CM

## 2025-06-21 DIAGNOSIS — I10 BENIGN ESSENTIAL HYPERTENSION: Primary | ICD-10-CM

## 2025-06-21 DIAGNOSIS — F17.210 CIGARETTE SMOKER: ICD-10-CM

## 2025-06-21 PROCEDURE — 3078F DIAST BP <80 MM HG: CPT | Performed by: FAMILY MEDICINE

## 2025-06-21 PROCEDURE — 99214 OFFICE O/P EST MOD 30 MIN: CPT | Performed by: FAMILY MEDICINE

## 2025-06-21 PROCEDURE — 3074F SYST BP LT 130 MM HG: CPT | Performed by: FAMILY MEDICINE

## 2025-06-21 PROCEDURE — 3008F BODY MASS INDEX DOCD: CPT | Performed by: FAMILY MEDICINE

## 2025-06-21 RX ORDER — DULAGLUTIDE 1.5 MG/.5ML
1.5 INJECTION, SOLUTION SUBCUTANEOUS
Qty: 12 EACH | Refills: 0 | Status: SHIPPED | OUTPATIENT
Start: 2025-06-22 | End: 2025-09-20

## 2025-06-21 RX ORDER — ICOSAPENT ETHYL 1 G/1
2 CAPSULE ORAL 2 TIMES DAILY
Qty: 360 CAPSULE | Refills: 0 | Status: SHIPPED | OUTPATIENT
Start: 2025-06-21 | End: 2025-09-19

## 2025-06-21 NOTE — PROGRESS NOTES
Subjective   Patient ID: Phi Viera is a 55 y.o. male who presents for Follow-up.    HPI     No new concerns     Review labs: 05/24/2025  Review CT results : 03/29/2025  Colon: 03/18/2024 6/2/2025 CT of chest for lung cancer screening revealed mild bilateral upper lung predominant central lobar and paraseptal emphysema.  Pt is unfortunately still smoking.  He does want to work on smoking cessation without assistance.      Patient has HTN.  He does not monitor his BP at home.   He reports that cardiology started him on amlodipine for elevated BP.  Denies chest pain, dizziness, LE swelling.      He has type 2 diabetes.  He does monitor his sugars at home & states they are improving but have not returned to previous baseline.   Pt denies any polyuria, polydipsia, polyphagia.     He has hyperlipidemia.  Patient tries to limit the amount of fatty foods and high cholesterol foods that are consumed.  He has been compliant with current medication and denies any noted side effects.  HE HAS BEEN INTOLERANT OF MULTIPLE STATINS DUE TO MYALGIA.     He has known vitamin D deficiency.  Pt has been compliant with Vit D supplementation.     He has depression.  Symptoms have been stable.  He continues to follow with his psychiatrist on a regular basis.    He is a long-term cigarette smoker.       Review of Systems  Constitutional: Patient denies any fever, chills, loss of appetite, or unexplained weight loss.  Cardiovascular: Patient denies any chest pain, shortness of breath with exertion, tachycardia, palpitations, orthopnea, or paroxysmal nocturnal dyspnea.  Respiratory: Patient denies any cough, shortness breath, or wheezing.  Gastrointestinal: Patient denies any nausea, vomiting, diarrhea, constipation, melena, hematochezia, or reflux symptoms.  Skin: Denies any rashes or skin lesions.   Neurology: Patient denies any new motor or sensory losses.  Denies any numbness, tingling, weakness, and incoordination of the  "extremities.  Patient also denies any tremor, seizures, or gait instability.  Endocrinology: Denies any polyuria, polydipsia, polyphagia, or heat/cold intolerance.      Objective   /76 (BP Location: Right arm, Patient Position: Sitting, BP Cuff Size: Adult)   Pulse 66   Temp 36.7 °C (98.1 °F) (Temporal)   Ht 1.778 m (5' 10\")   Wt 97.8 kg (215 lb 9.6 oz)   SpO2 95%   BMI 30.94 kg/m²     Physical Exam  General Appearance: Alert and cooperative, in no acute distress, well-developed/well-nourished.  Neck: Supple and without adenopathy or rigidity.  There is no JVD at 90° and no carotid bruits are noted.  There is no thyromegaly, thyroid tenderness, or palpable thyroid nodules.  Heart: Regular rate and rhythm without murmur or ectopy.  Respiratory: Lungs are clear to auscultation bilaterally with good air exchange.  Good respiratory effort and no accessory muscle use.  Skin: Good turgor, moist, warm and without rashes or lesions.  Neurological exam: Alert and oriented ×3, no tremor, normal gait.  Extremities: No clubbing, cyanosis, or edema      Assessment/Plan     HTN:    Blood pressure appears adequately controlled and we will continue with the current antihypertensive therapy.    Hyperlipidemia:   Patient will continue with the current medication.    Dietary changes, exercise, and maintenance of healthy weight were discussed at length.  Lab Results   Component Value Date    CHOL 179 05/24/2025    CHOL 143 11/11/2024    CHOL 124 04/01/2024     Lab Results   Component Value Date    HDL 40 05/24/2025    HDL 34.4 11/11/2024    HDL 36.9 04/01/2024     Lab Results   Component Value Date    LDLCALC 114 (H) 05/24/2025    LDLCALC 53 11/11/2024    LDLCALC 44 04/01/2024     Lab Results   Component Value Date    TRIG 131 05/24/2025    TRIG 276 (H) 11/11/2024    TRIG 215 (H) 04/01/2024   LDL has increased on last labs.  He will work on dietary changes.      Diabetes mellitus type 2:   Dietary changes, exercise, and " maintenance of the healthy weight were encouraged.  Patient was advised to have a diabetic eye exam annually.  Patient was also advised to check the feet on a regular basis.  Lab Results   Component Value Date    HGBA1C 7.0 (H) 05/24/2025    HGBA1C 6.3 (H) 02/10/2025    HGBA1C 6.6 (H) 11/11/2024   His A1c has increased.  Admits to a poor diet.  He will work on appropriate dietary changes before consideration of medication adjustment.    Vitamin D deficiency:  Lab Results   Component Value Date    VITD25 32 05/24/2025    VITD25 28 (L) 02/10/2025    VITD25 32 04/01/2024   Stable based on most recent labs.  Patient to continue the current dose of vitamin D supplementation.    Cigarette smoker:  Patient understands that continued smoking will increase the risks of lung disease, vascular disease, and multiple malignancies.  Pt. has been encouraged to work on smoking cessation.  He wishes to work on smoking cessation without assistance.    Centrilobular emphysema:  6/2025 CT scan reveals findings of emphysema.  We discussed the importance of smoking cessation.          LOW-DOSE CT OF CHEST DUE 6/2/2026        Orders Placed This Encounter   Procedures    Hemoglobin A1C    Basic Metabolic Panel     Requested Prescriptions     Signed Prescriptions Disp Refills    icosapent ethyL (Vascepa) 1 gram capsule 360 capsule 0     Sig: Take 2 capsules (2 g) by mouth 2 times a day.    dulaglutide (Trulicity) 1.5 mg/0.5 mL pen injector injection 12 each 0     Sig: Inject 1.5 mg under the skin 1 (one) time per week.

## 2025-06-21 NOTE — PATIENT INSTRUCTIONS
Follow up in 3 months with labs to be done PRIOR.    It was a pleasure to see you today. Thank you for choosing us for your health care needs.    If you have lab or other testing completed and have not been informed of results within one week, please call the office for your results.    If you haven't done so, consider signing up for [facility], the [facility] personal health record. Ask the staff how you can get started.

## 2025-07-07 ENCOUNTER — APPOINTMENT (OUTPATIENT)
Dept: CARDIOLOGY | Facility: CLINIC | Age: 55
End: 2025-07-07
Payer: COMMERCIAL

## 2025-07-18 ENCOUNTER — HOSPITAL ENCOUNTER (OUTPATIENT)
Dept: CARDIOLOGY | Facility: CLINIC | Age: 55
Discharge: HOME | End: 2025-07-18
Payer: COMMERCIAL

## 2025-07-18 LAB
ATRIAL RATE: 67 BPM
P AXIS: 54 DEGREES
P OFFSET: 185 MS
P ONSET: 130 MS
PR INTERVAL: 178 MS
Q ONSET: 219 MS
QRS COUNT: 11 BEATS
QRS DURATION: 82 MS
QT INTERVAL: 366 MS
QTC CALCULATION(BAZETT): 386 MS
QTC FREDERICIA: 380 MS
R AXIS: 29 DEGREES
T AXIS: 50 DEGREES
T OFFSET: 402 MS
VENTRICULAR RATE: 67 BPM

## 2025-07-18 PROCEDURE — 93005 ELECTROCARDIOGRAM TRACING: CPT

## 2025-08-03 DIAGNOSIS — K21.9 GASTROESOPHAGEAL REFLUX DISEASE, UNSPECIFIED WHETHER ESOPHAGITIS PRESENT: ICD-10-CM

## 2025-08-04 RX ORDER — OMEPRAZOLE 20 MG/1
CAPSULE, DELAYED RELEASE ORAL
Qty: 30 CAPSULE | Refills: 0 | Status: SHIPPED | OUTPATIENT
Start: 2025-08-04

## 2025-08-25 ENCOUNTER — APPOINTMENT (OUTPATIENT)
Dept: DERMATOLOGY | Facility: CLINIC | Age: 55
End: 2025-08-25
Payer: COMMERCIAL

## 2025-10-18 ENCOUNTER — APPOINTMENT (OUTPATIENT)
Dept: PRIMARY CARE | Facility: CLINIC | Age: 55
End: 2025-10-18
Payer: COMMERCIAL

## 2025-11-03 ENCOUNTER — APPOINTMENT (OUTPATIENT)
Dept: CARDIOLOGY | Facility: CLINIC | Age: 55
End: 2025-11-03
Payer: COMMERCIAL

## 2025-11-17 ENCOUNTER — APPOINTMENT (OUTPATIENT)
Dept: DERMATOLOGY | Facility: CLINIC | Age: 55
End: 2025-11-17
Payer: COMMERCIAL

## 2025-12-20 ENCOUNTER — APPOINTMENT (OUTPATIENT)
Dept: PRIMARY CARE | Facility: CLINIC | Age: 55
End: 2025-12-20
Payer: COMMERCIAL

## 2026-01-20 ENCOUNTER — APPOINTMENT (OUTPATIENT)
Dept: CARDIOLOGY | Facility: CLINIC | Age: 56
End: 2026-01-20
Payer: COMMERCIAL

## (undated) DEVICE — SOLUTION, IRRIGATION, STERILE WATER, 1000 ML, POUR BOTTLE

## (undated) DEVICE — DRESSING, GAUZE, SUPER KERLIX, 6X6

## (undated) DEVICE — SUTURE, VICRYL, 2-0, 27 IN, SH, UNDYED

## (undated) DEVICE — GLOVE, SURGICAL, BIOGEL, 7.5, PF, LATEX, GREEN

## (undated) DEVICE — Device

## (undated) DEVICE — SUTURE, SILK, 2-0, 18 IN, BR PS, BLACK

## (undated) DEVICE — BRIEF, PANTY, MESH, XL, 2PK

## (undated) DEVICE — PAD, POST, PERINEAL, ADULT

## (undated) DEVICE — PREP TRAY, VAGINAL

## (undated) DEVICE — SOLUTION, IRRIGATION, SODIUM CHLORIDE 0.9%, 1000 ML, POUR BOTTLE